# Patient Record
(demographics unavailable — no encounter records)

---

## 2024-10-07 NOTE — DISEASE MANAGEMENT
[Pathological] : TNM Stage: p [N/A] : Currently not applicable [TTNM] : x [NTNM] : x [MTNM] : x [de-identified] : 300cGy [de-identified] : 3000cGy [de-identified] : RIGHT Pelvis

## 2024-10-07 NOTE — HISTORY OF PRESENT ILLNESS
[FreeTextEntry1] : Mr. Durham is a 59-year-old Rwandan speaking male who presents for an on treatment visit.  He is accompanied by his sister for today's visit.    Diagnosis: Newly Diagnosed Plasma Cell Myeloma with right iliac destructive lesion  RADIATION Therapy: 10/7/24 - started on RADIATION Therapy to RIGHT Pelvis, 3000 cGy in 10 fx   HPI: Mr. Durham originally presented to orthopedist, Dr. Espino, in July 2024 with complaints of mid/lower back pain and right hip pain for several months.  On review of his records, he was found to have lytic lesion in right ilium. As per note he had scans at [facility] (MRI Prostate, CT urogram, and NM whole body scan) that revealed numerous bone lesions, suspicious for metastatic disease, including osseous mass within the right iliac bone. (see reports below) MRI of RIGHT hip was ordered.    4/30/24 - Prostate MRI: 1. A 1.0 cm benign prostate utricle cyst. 2. No focal lesion suspicious for significant prostate cancer PIRADS 2. 3. Numerous bone lesions measuring up to 6.4 cm highly suspicious for osseous metastases, multiple myeloma, or primary osseous malignancy. Recommend further evaluation with pelvis radiographs, bone scan, and MRI of pelvis.   5/9/24 - CT A/P: 1. Aggressive osseous mass within the right iliac bone, differential diagnosis includes metastatic disease versus myeloma. 2. Small non-obstructing right intrarenal calculus.    6/3/24 - Bone Scan: 1. There is mild increased radionuclide uptake within the right hemipelvis corresponding to an osteolytic lesion see in this location on a recent CT. Finding is suspicious for malignancy. Suggest tissue diagnosis. This finding could be further characterized with MRI if clinically indicated. 2. Otherwise, unremarkable whole body bone scan.   8/12/24 - MRI of RIGHT Hip: 1. Lytic expansile lesion within the right iliac crest extending to the supraacetabular region with significant endosteal thinning, enhancement, and fluid between the lesion in the adjacent iliacus muscles which would suggest impending pathologic fracture. There is an incomplete fracture within the supraacetabular region. There is additional 12 mm lesion with the same signal characteristics in the posterior superior acetabulum.  Differential would include metastatic disease or myeloma.  This should be correlated with the patient's clinical history. 2. Cam deformity with moderate arthrosis, diffuse labral tear, and joint effusion.    8/20/24 - presented to Dr. Alvarez (hematology) for consultation for right pelvic bone mass. Patient is originally from [facility], [place]-speaking only, no relatives in the [place]. Lytic expansile lesion within the right iliac crest seen on MRI from 8/12/2024 raises the possibility of underlying multiple myeloma or metastatic neoplasm. More ominous, patient has what appears to be an impending pathologic right hip fracture, and incomplete fracture within the supra-acetabular region. No histologic diagnosis available. Needs evaluation by ortho and need work up for diagnosis. Referred to IR for biopsy.   As work up as outpatient was difficult, he was sent to [facility] for admission.   Admitted at [facility] from 8/23/24 - 9/1/24 to undergo workup:   8/22/24 - CT Angio Chest:  1. No pulmonary embolism. 2. The bones appear overall demineralized. Right iliac bone destructive lesion measuring 6.8 x 4.8 cm with extraosseous component extending into the iliacus muscles. Additionally, there is a lytic lesion within T7 vertebral body, questionable lytic lesion within T10 vertebral body, and fracture of the lateral right 9th rib.  3. The exact etiology is unclear, but multiple myeloma is favored over metastasis.    8/23/24 - CT Pelvis Bony: CT of pelvis demonstrates an expansile lytic lesion at the right iliac bone measuring up to 8.1 cm with cortical destruction. Additional ill-defined lytic lesion is seen at the posterior superior acetabulum measuring up to 2.2 cm.   8/26/24 - underwent Bone Marrow Biopsy and Bone Marrow Aspirate: Pathology - Plasma cell Myeloma, 40% plasma cells by differential counts in hemodilute, hypocellular aspirate smears.   While inpatient Mr. Durham was seen by Ortho - no acute surgical intervention and recommended radiation.  Inpatient radiation team also saw patient, as not in acute pain and is largely asymptomatic, he will follow up as an outpatient.    9/9/24 - PET Scan: 1. FDG avid lytic lesions compatible with active multiple myeloma. For follow-up, please request whole body imaging from skull vertex to toes. 2. New since the most recent CT chest nonavid groundglass changes in the right middle lobe, likely inflammatory process. Reassess on follow-up. Details: Bones/soft tissues:  FDG avid lytic lesions compatible with active multiple myeloma, a few examples below: Known right iliac bone lesion with expansile deformity, cortical disruptions and soft tissue component shows SUV 5.7; lesion extends to the right acetabular roof and posterior column of the right acetabulum. Smaller FDG avid lesion in the posterior aspect of the left acetabulum (image 190) with SUV 4.2. Other lesions include left 7th rib with lytic and soft tissue component (image 113) shows SUV 5.6 and less apparent lytic changes in the left 8hth rib with SUV 4.4. T7 with superior endplate sclerosis anteriorly showing minimal activity SUV 4.2 and posterior nonavid lytic lesion (image 101).  9/17/24 - saw Dr. Alvarez (hematology) in follow up as outpatient. Discussed diagnosis of multiple myeloma and PET scan results, compatible with active myeloma. To start on cytoreductive therapy with quadruple regimen (Bortezomib, Revlimid, dexamethasone and daratumumab), to complete 8 cycles prior to reevaluation. Discussed with patient the goal of treatment being remission and subsequent evaluation for autologous stem cell transplant. 9/17/24 - started on Systemic Treatment with Dr. Alvarez (hematology)   9/23/24 - presented in consultation to discuss radiation therapy options.  Mr. Durham is feeling well today.  He complains of pain in the right pelvis towards the back that goes down his leg.  He states it is worse when walking, denies any weakness or numbness of the leg.  He is using crutches to assist with ambulation.  He doesn't take anything for the pain, is about a 5 out of 10. He has started on systemic treatment with Dr. Alvarez (hematology) which he tolerated well, due for next treatment tomorrow.  His sister is here helping him, she lives in [place].  Patient expressed possibility of trying to move to [place] if his insurance will cover treatment there so that he can be closer to family.   in to see patient to discuss transportation options. Mr. Durham attended today with his sister. He has recently been diagnosed with multiple myeloma and has started chemotherapy with Dr Alvarez. He has had long term pain for many months in the right iliac bone region. Imaging has shown multiple lytic lesions throughout the axial skeleton, with the most significant lesion being in the right iliac bone lesion. We reviewed the imaging together. Offered him radiation to the iliac bone lesion to a dose of 30Gy in 10 fractions. Recommend that chemotherapy is held during radiation. We discussed logistics, possible acute and late side effects in detail and he has signed consent. He will return for simulation.  10/7/24 - started on RADIATION Therapy to RIGHT Pelvis, 3000 cGy in 10 fx   10/7/24 - OTV 1/10 fx completed. Reinforced what to expect with radiation therapy, possible side effects, as well as skin care to the treatment area. No new complaints offered.  All questions answered.  He knows that systemic treatment will be held while on radiation therapy. He also stated that the past few days he saw small amount of blood when he moves his bowels, Dr. Alvarez to made aware. He continues to use crutches to assist with ambulation.

## 2024-10-07 NOTE — VITALS
[Pain Description/Quality: ___] : Pain description/quality: [unfilled] [Pain Duration: ___] : Pain duration: [unfilled] [Pain Location: ___] : Pain Location: [unfilled] [Pain Interferes with ADLs] : Pain interferes with activities of daily living. [NoTreatment Scheduled] : no treatment scheduled [Maximal Pain Intensity: 5/10] : 5/10 [Least Pain Intensity: 5/10] : 5/10 [80: Normal activity with effort; some signs or symptoms of disease.] : 80: Normal activity with effort; some signs or symptoms of disease.  [ECOG Performance Status: 1 - Restricted in physically strenuous activity but ambulatory and able to carry out work of a light or sedentary nature] : Performance Status: 1 - Restricted in physically strenuous activity but ambulatory and able to carry out work of a light or sedentary nature, e.g., light house work, office work

## 2024-10-07 NOTE — REVIEW OF SYSTEMS
[Constipation: Grade 1 - Occasional or intermittent symptoms; occasional use of stool softeners, laxatives, dietary modification, or enema] : Constipation: Grade 1 - Occasional or intermittent symptoms; occasional use of stool softeners, laxatives, dietary modification, or enema [Diarrhea: Grade 0] : Diarrhea: Grade 0 [Fatigue: Grade 1 - Fatigue relieved by rest] : Fatigue: Grade 1 - Fatigue relieved by rest [Skin Hyperpigmentation: Grade 0] : Skin Hyperpigmentation: Grade 0 [Dermatitis Radiation: Grade 0] : Dermatitis Radiation: Grade 0

## 2024-10-07 NOTE — DISEASE MANAGEMENT
[Pathological] : TNM Stage: p [N/A] : Currently not applicable [TTNM] : x [NTNM] : x [MTNM] : x [de-identified] : 300cGy [de-identified] : 3000cGy [de-identified] : RIGHT Pelvis

## 2024-10-07 NOTE — REASON FOR VISIT
[Routine On-Treatment] : a routine on-treatment visit for [Other: ___] : [unfilled] [Other: ______] : provided by [facility] [Other: _____] : [unfilled] [Interpreters_IDNumber] : [id number] [TWNoteComboBox1] : French

## 2024-10-07 NOTE — HISTORY OF PRESENT ILLNESS
[FreeTextEntry1] : Mr. Durham is a 59-year-old Gabonese speaking male who presents for an on treatment visit.  He is accompanied by his sister for today's visit.    Diagnosis: Newly Diagnosed Plasma Cell Myeloma with right iliac destructive lesion  RADIATION Therapy: 10/7/24 - started on RADIATION Therapy to RIGHT Pelvis, 3000 cGy in 10 fx   HPI: Mr. Durham originally presented to orthopedist, Dr. Espino, in July 2024 with complaints of mid/lower back pain and right hip pain for several months.  On review of his records, he was found to have lytic lesion in right ilium. As per note he had scans at [facility] (MRI Prostate, CT urogram, and NM whole body scan) that revealed numerous bone lesions, suspicious for metastatic disease, including osseous mass within the right iliac bone. (see reports below) MRI of RIGHT hip was ordered.    4/30/24 - Prostate MRI: 1. A 1.0 cm benign prostate utricle cyst. 2. No focal lesion suspicious for significant prostate cancer PIRADS 2. 3. Numerous bone lesions measuring up to 6.4 cm highly suspicious for osseous metastases, multiple myeloma, or primary osseous malignancy. Recommend further evaluation with pelvis radiographs, bone scan, and MRI of pelvis.   5/9/24 - CT A/P: 1. Aggressive osseous mass within the right iliac bone, differential diagnosis includes metastatic disease versus myeloma. 2. Small non-obstructing right intrarenal calculus.    6/3/24 - Bone Scan: 1. There is mild increased radionuclide uptake within the right hemipelvis corresponding to an osteolytic lesion see in this location on a recent CT. Finding is suspicious for malignancy. Suggest tissue diagnosis. This finding could be further characterized with MRI if clinically indicated. 2. Otherwise, unremarkable whole body bone scan.   8/12/24 - MRI of RIGHT Hip: 1. Lytic expansile lesion within the right iliac crest extending to the supraacetabular region with significant endosteal thinning, enhancement, and fluid between the lesion in the adjacent iliacus muscles which would suggest impending pathologic fracture. There is an incomplete fracture within the supraacetabular region. There is additional 12 mm lesion with the same signal characteristics in the posterior superior acetabulum.  Differential would include metastatic disease or myeloma.  This should be correlated with the patient's clinical history. 2. Cam deformity with moderate arthrosis, diffuse labral tear, and joint effusion.    8/20/24 - presented to Dr. Alvarez (hematology) for consultation for right pelvic bone mass. Patient is originally from [facility], [place]-speaking only, no relatives in the [place]. Lytic expansile lesion within the right iliac crest seen on MRI from 8/12/2024 raises the possibility of underlying multiple myeloma or metastatic neoplasm. More ominous, patient has what appears to be an impending pathologic right hip fracture, and incomplete fracture within the supra-acetabular region. No histologic diagnosis available. Needs evaluation by ortho and need work up for diagnosis. Referred to IR for biopsy.   As work up as outpatient was difficult, he was sent to [facility] for admission.   Admitted at [facility] from 8/23/24 - 9/1/24 to undergo workup:   8/22/24 - CT Angio Chest:  1. No pulmonary embolism. 2. The bones appear overall demineralized. Right iliac bone destructive lesion measuring 6.8 x 4.8 cm with extraosseous component extending into the iliacus muscles. Additionally, there is a lytic lesion within T7 vertebral body, questionable lytic lesion within T10 vertebral body, and fracture of the lateral right 9th rib.  3. The exact etiology is unclear, but multiple myeloma is favored over metastasis.    8/23/24 - CT Pelvis Bony: CT of pelvis demonstrates an expansile lytic lesion at the right iliac bone measuring up to 8.1 cm with cortical destruction. Additional ill-defined lytic lesion is seen at the posterior superior acetabulum measuring up to 2.2 cm.   8/26/24 - underwent Bone Marrow Biopsy and Bone Marrow Aspirate: Pathology - Plasma cell Myeloma, 40% plasma cells by differential counts in hemodilute, hypocellular aspirate smears.   While inpatient Mr. Durham was seen by Ortho - no acute surgical intervention and recommended radiation.  Inpatient radiation team also saw patient, as not in acute pain and is largely asymptomatic, he will follow up as an outpatient.    9/9/24 - PET Scan: 1. FDG avid lytic lesions compatible with active multiple myeloma. For follow-up, please request whole body imaging from skull vertex to toes. 2. New since the most recent CT chest nonavid groundglass changes in the right middle lobe, likely inflammatory process. Reassess on follow-up. Details: Bones/soft tissues:  FDG avid lytic lesions compatible with active multiple myeloma, a few examples below: Known right iliac bone lesion with expansile deformity, cortical disruptions and soft tissue component shows SUV 5.7; lesion extends to the right acetabular roof and posterior column of the right acetabulum. Smaller FDG avid lesion in the posterior aspect of the left acetabulum (image 190) with SUV 4.2. Other lesions include left 7th rib with lytic and soft tissue component (image 113) shows SUV 5.6 and less apparent lytic changes in the left 8hth rib with SUV 4.4. T7 with superior endplate sclerosis anteriorly showing minimal activity SUV 4.2 and posterior nonavid lytic lesion (image 101).  9/17/24 - saw Dr. Alvarez (hematology) in follow up as outpatient. Discussed diagnosis of multiple myeloma and PET scan results, compatible with active myeloma. To start on cytoreductive therapy with quadruple regimen (Bortezomib, Revlimid, dexamethasone and daratumumab), to complete 8 cycles prior to reevaluation. Discussed with patient the goal of treatment being remission and subsequent evaluation for autologous stem cell transplant. 9/17/24 - started on Systemic Treatment with Dr. Alvarez (hematology)   9/23/24 - presented in consultation to discuss radiation therapy options.  Mr. Durham is feeling well today.  He complains of pain in the right pelvis towards the back that goes down his leg.  He states it is worse when walking, denies any weakness or numbness of the leg.  He is using crutches to assist with ambulation.  He doesn't take anything for the pain, is about a 5 out of 10. He has started on systemic treatment with Dr. Alvarez (hematology) which he tolerated well, due for next treatment tomorrow.  His sister is here helping him, she lives in [place].  Patient expressed possibility of trying to move to [place] if his insurance will cover treatment there so that he can be closer to family.   in to see patient to discuss transportation options. Mr. Durham attended today with his sister. He has recently been diagnosed with multiple myeloma and has started chemotherapy with Dr Alvarez. He has had long term pain for many months in the right iliac bone region. Imaging has shown multiple lytic lesions throughout the axial skeleton, with the most significant lesion being in the right iliac bone lesion. We reviewed the imaging together. Offered him radiation to the iliac bone lesion to a dose of 30Gy in 10 fractions. Recommend that chemotherapy is held during radiation. We discussed logistics, possible acute and late side effects in detail and he has signed consent. He will return for simulation.  10/7/24 - started on RADIATION Therapy to RIGHT Pelvis, 3000 cGy in 10 fx   10/7/24 - OTV 1/10 fx completed. Reinforced what to expect with radiation therapy, possible side effects, as well as skin care to the treatment area. No new complaints offered.  All questions answered.  He knows that systemic treatment will be held while on radiation therapy. He also stated that the past few days he saw small amount of blood when he moves his bowels, Dr. Alvarez to made aware. He continues to use crutches to assist with ambulation.

## 2024-10-07 NOTE — REASON FOR VISIT
[Routine On-Treatment] : a routine on-treatment visit for [Other: ___] : [unfilled] [Other: ______] : provided by [facility] [Other: _____] : [unfilled] [Interpreters_IDNumber] : [id number] [TWNoteComboBox1] : Filipino

## 2024-10-14 NOTE — HISTORY OF PRESENT ILLNESS
[FreeTextEntry1] : Mr. Durham is a 59-year-old Guinean speaking male who presents for an on treatment visit.  He is accompanied by his sister for today's visit.    Diagnosis: Newly Diagnosed Plasma Cell Myeloma with RIGHT iliac destructive lesion  RADIATION Therapy: 10/7/24 - started on RADIATION Therapy to RIGHT Pelvis, 3000 cGy in 10 fx   HPI: Mr. Durham originally presented to orthopedist, Dr. Espino, in July 2024 with complaints of mid/lower back pain and right hip pain for several months.  On review of his records, he was found to have lytic lesion in right ilium. As per note he had scans at [facility] (MRI Prostate, CT urogram, and NM whole body scan) that revealed numerous bone lesions, suspicious for metastatic disease, including osseous mass within the right iliac bone. (see reports below) MRI of RIGHT hip was ordered.    4/30/24 - Prostate MRI: 1. A 1.0 cm benign prostate utricle cyst. 2. No focal lesion suspicious for significant prostate cancer PIRADS 2. 3. Numerous bone lesions measuring up to 6.4 cm highly suspicious for osseous metastases, multiple myeloma, or primary osseous malignancy. Recommend further evaluation with pelvis radiographs, bone scan, and MRI of pelvis.   5/9/24 - CT A/P: 1. Aggressive osseous mass within the right iliac bone, differential diagnosis includes metastatic disease versus myeloma. 2. Small non-obstructing right intrarenal calculus.    6/3/24 - Bone Scan: 1. There is mild increased radionuclide uptake within the right hemipelvis corresponding to an osteolytic lesion see in this location on a recent CT. Finding is suspicious for malignancy. Suggest tissue diagnosis. This finding could be further characterized with MRI if clinically indicated. 2. Otherwise, unremarkable whole body bone scan.   8/12/24 - MRI of RIGHT Hip: 1. Lytic expansile lesion within the right iliac crest extending to the supraacetabular region with significant endosteal thinning, enhancement, and fluid between the lesion in the adjacent iliacus muscles which would suggest impending pathologic fracture. There is an incomplete fracture within the supraacetabular region. There is additional 12 mm lesion with the same signal characteristics in the posterior superior acetabulum.  Differential would include metastatic disease or myeloma.  This should be correlated with the patient's clinical history. 2. Cam deformity with moderate arthrosis, diffuse labral tear, and joint effusion.    8/20/24 - presented to Dr. Alvarez (hematology) for consultation for right pelvic bone mass. Patient is originally from [facility], Gu[place]an-speaking only, no relatives in the [place]. Lytic expansile lesion within the right iliac crest seen on MRI from 8/12/2024 raises the possibility of underlying multiple myeloma or metastatic neoplasm. More ominous, patient has what appears to be an impending pathologic right hip fracture, and incomplete fracture within the supra-acetabular region. No histologic diagnosis available. Needs evaluation by ortho and need work up for diagnosis. Referred to IR for biopsy.   As work up as outpatient was difficult, he was sent to [facility] for admission.   Admitted at [facility] from 8/23/24 - 9/1/24 to undergo workup:   8/22/24 - CT Angio Chest:  1. No pulmonary embolism. 2. The bones appear overall demineralized. Right iliac bone destructive lesion measuring 6.8 x 4.8 cm with extraosseous component extending into the iliacus muscles. Additionally, there is a lytic lesion within T7 vertebral body, questionable lytic lesion within T10 vertebral body, and fracture of the lateral right 9th rib.  3. The exact etiology is unclear, but multiple myeloma is favored over metastasis.    8/23/24 - CT Pelvis Bony: CT of pelvis demonstrates an expansile lytic lesion at the right iliac bone measuring up to 8.1 cm with cortical destruction. Additional ill-defined lytic lesion is seen at the posterior superior acetabulum measuring up to 2.2 cm.   8/26/24 - underwent Bone Marrow Biopsy and Bone Marrow Aspirate: Pathology - Plasma cell Myeloma, 40% plasma cells by differential counts in hemodilute, hypocellular aspirate smears.   While inpatient Mr. Durham was seen by Ortho - no acute surgical intervention and recommended radiation.  Inpatient radiation team also saw patient, as not in acute pain and is largely asymptomatic, he will follow up as an outpatient.    9/9/24 - PET Scan: 1. FDG avid lytic lesions compatible with active multiple myeloma. For follow-up, please request whole body imaging from skull vertex to toes. 2. New since the most recent CT chest nonavid groundglass changes in the right middle lobe, likely inflammatory process. Reassess on follow-up. Details: Bones/soft tissues:  FDG avid lytic lesions compatible with active multiple myeloma, a few examples below: Known right iliac bone lesion with expansile deformity, cortical disruptions and soft tissue component shows SUV 5.7; lesion extends to the right acetabular roof and posterior column of the right acetabulum. Smaller FDG avid lesion in the posterior aspect of the left acetabulum (image 190) with SUV 4.2. Other lesions include left 7th rib with lytic and soft tissue component (image 113) shows SUV 5.6 and less apparent lytic changes in the left 8hth rib with SUV 4.4. T7 with superior endplate sclerosis anteriorly showing minimal activity SUV 4.2 and posterior nonavid lytic lesion (image 101).  9/17/24 - saw Dr. Alvarez (hematology) in follow up as outpatient. Discussed diagnosis of multiple myeloma and PET scan results, compatible with active myeloma. To start on cytoreductive therapy with quadruple regimen (Bortezomib, Revlimid, dexamethasone and daratumumab), to complete 8 cycles prior to reevaluation. Discussed with patient the goal of treatment being remission and subsequent evaluation for autologous stem cell transplant. 9/17/24 - started on Systemic Treatment with Dr. Alvarez (hematology)   9/23/24 - presented in consultation to discuss radiation therapy options.  Mr. Durham is feeling well today.  He complains of pain in the right pelvis towards the back that goes down his leg.  He states it is worse when walking, denies any weakness or numbness of the leg.  He is using crutches to assist with ambulation.  He doesn't take anything for the pain, is about a 5 out of 10. He has started on systemic treatment with Dr. Alvarez (hematology) which he tolerated well, due for next treatment tomorrow.  His sister is here helping him, she lives in [place].  Patient expressed possibility of trying to move to [place] if his insurance will cover treatment there so that he can be closer to family.   in to see patient to discuss transportation options. Mr. Durham attended today with his sister. He has recently been diagnosed with multiple myeloma and has started chemotherapy with Dr Alvarez. He has had long term pain for many months in the right iliac bone region. Imaging has shown multiple lytic lesions throughout the axial skeleton, with the most significant lesion being in the right iliac bone lesion. We reviewed the imaging together. Offered him radiation to the iliac bone lesion to a dose of 30Gy in 10 fractions. Recommend that chemotherapy is held during radiation. We discussed logistics, possible acute and late side effects in detail and he has signed consent. He will return for simulation.  10/7/24 - started on RADIATION Therapy to RIGHT Pelvis, 3000 cGy in 10 fx   10/7/24 - OTV 1/10 fx completed. Reinforced what to expect with radiation therapy, possible side effects, as well as skin care to the treatment area. No new complaints offered.  All questions answered.  He knows that systemic treatment will be held while on radiation therapy. He also stated that the past few days he saw small amount of blood when he moves his bowels, Dr. Alvarez to made aware. He continues to use crutches to assist with ambulation.  No infusional chemo during RT in view of large field.  10/14/24 - OTV 6/10 fx completed. Mr. Durham continues to tolerate treatment well. He states that he feels he is walking better since starting the radiation. Pain is better. Denies any other complaints. No bowel or urinary issues. Looks forward to completing treatment this week.   Discharge instructions/folder given and reviewed.  Post treatment evaluation appointment scheduled.

## 2024-10-14 NOTE — REASON FOR VISIT
[Routine On-Treatment] : a routine on-treatment visit for [Other: ___] : [unfilled] [Other: _____] : [unfilled] [Other: ______] : provided by [employer] [Interpreters_IDNumber] : [id number] [[email or web address]] : [place]

## 2024-10-14 NOTE — DISEASE MANAGEMENT
[Pathological] : TNM Stage: p [N/A] : Currently not applicable [TTNM] : x [NTNM] : x [MTNM] : x [de-identified] : 300cGy [de-identified] : 3000cGy [de-identified] : RIGHT Pelvis

## 2024-10-14 NOTE — VITALS
[Maximal Pain Intensity: 5/10] : 5/10 [Pain Description/Quality: ___] : Pain description/quality: [unfilled] [Pain Duration: ___] : Pain duration: [unfilled] [Pain Location: ___] : Pain Location: [unfilled] [Least Pain Intensity: 1/10] : 1/10 [Pain Interferes with ADLs] : Pain interferes with activities of daily living. [80: Normal activity with effort; some signs or symptoms of disease.] : 80: Normal activity with effort; some signs or symptoms of disease.  [ECOG Performance Status: 1 - Restricted in physically strenuous activity but ambulatory and able to carry out work of a light or sedentary nature] : Performance Status: 1 - Restricted in physically strenuous activity but ambulatory and able to carry out work of a light or sedentary nature, e.g., light house work, office work

## 2024-10-29 NOTE — BEGINNING OF VISIT
[0] : 2) Feeling down, depressed, or hopeless: Not at all (0) [PHQ-2 Negative] : PHQ-2 Negative [Advised Primary Care Follow-up] : Advised Primary Care Follow-up  [Pain Scale: ___] : On a scale of 1-10, today the patient's pain is a(n) [unfilled]. [Medication(s)] : Medication(s) [Referred to Palliative/Pain Management] : Referred to Palliative/Pain Management [Former] : Former [Patient/Caregiver not ready to engage] : Patient/Caregiver not ready to engage

## 2024-10-29 NOTE — HISTORY OF PRESENT ILLNESS
[de-identified] : 59M, PMHx colon polyps, reporting right hip pain since August 2024, referred for medical oncology consultation of right pelvis bone mass.  CASE SYNOPSIS: 7/23/2024 colonoscopy( Dr. Emely Mccarthy)-findings: A 12 mm polyp in the sigmoid colon, semi- pedunculated, removed with hot snare External hemorrhoids grade 1  8/12/2024 MRI right hip without contrast ([facility])- IMPRESSION: 1.  Lytic expansile lesion within the right iliac crest extending to the supra-acetabular region with significant endosteal thinning, enhancement, and fluid between the lesion and the adjacent iliac us muscle which would suggest impending pathologic fracture.  There is an incomplete fracture within the supra-acetabular region.  There is additional 12 mm lesion with the same signal characteristics in the posterior superior acetabulum.  Differential would include metastatic disease or myeloma. 2.  Cam deformity with moderate arthrosis, diffuse labral tear and joint effusion.  8/23/2024 TTE- IMPRESSION:  1. Left ventricular cavity is normal in size. Left ventricular wall thickness is normal. Left ventricular systolic function is mildly decreased with an ejection fraction of 52 % by Ruiz's method of disks. There are no regional wall motion abnormalities seen.  2. There is mild (grade 1) left ventricular diastolic dysfunction, with normal left ventricular filling pressure.  3. Enlarged right ventricular cavity size, with normal wall thickness, and normal right ventricular systolic function.  4. Mild mitral regurgitation.  5. Normal left and right atrial size.  6. No pericardial effusion seen.  7. No prior echocardiogram is available for comparison.  8/26/2024 bone marrow studies plasma cell myeloma, 40% plasma cells,  positive. Flow cytometry-aberrant plasma cells (0.65% of cells, 87% of plasma cells) detected by flow cytometry analysis.  Congo red negative.  M spike 8.1 g/dL  9/9/2024 PET- IMPRESSION: 1. FDG avid lytic lesions with few examples above compatible with active multiple myeloma. For follow-up please request whole body imaging from skull vertex to toes. 2. New since the most recent CT chest nonavid groundglass changes in the right middle lobe, likely inflammatory process. Reassess on follow-up.  10/7 - 10/18/2024 completed 3000 cGy to right iliac destructive lesion. [FreeTextEntry1] : Workup in progress [de-identified] : Started RT to right pelvis on 10/7/2024 and completed 3000 cGy to right iliac destructive lesion on October 18,2024.  Continues to complain of mid/lower back pain and right hip pain for several months.  Ambulates without assistive devices.  Since the last visit there has been no changes in physical examination. Reports no new symptoms; clinically stable.  Denies recent hospitalization.  Medication list unchanged. No other changes in medical, surgical or social history since last visit.  Hematologic profile stable. Accompanied by his sister, Silvia.

## 2024-10-29 NOTE — HISTORY OF PRESENT ILLNESS
[de-identified] : 59M, PMHx colon polyps, reporting right hip pain since August 2024, referred for medical oncology consultation of right pelvis bone mass.  CASE SYNOPSIS: 7/23/2024 colonoscopy( Dr. Emely Mccarthy)-findings: A 12 mm polyp in the sigmoid colon, semi- pedunculated, removed with hot snare External hemorrhoids grade 1  8/12/2024 MRI right hip without contrast ([facility])- IMPRESSION: 1.  Lytic expansile lesion within the right iliac crest extending to the supra-acetabular region with significant endosteal thinning, enhancement, and fluid between the lesion and the adjacent iliac us muscle which would suggest impending pathologic fracture.  There is an incomplete fracture within the supra-acetabular region.  There is additional 12 mm lesion with the same signal characteristics in the posterior superior acetabulum.  Differential would include metastatic disease or myeloma. 2.  Cam deformity with moderate arthrosis, diffuse labral tear and joint effusion.  8/23/2024 TTE- IMPRESSION:  1. Left ventricular cavity is normal in size. Left ventricular wall thickness is normal. Left ventricular systolic function is mildly decreased with an ejection fraction of 52 % by Ruiz's method of disks. There are no regional wall motion abnormalities seen.  2. There is mild (grade 1) left ventricular diastolic dysfunction, with normal left ventricular filling pressure.  3. Enlarged right ventricular cavity size, with normal wall thickness, and normal right ventricular systolic function.  4. Mild mitral regurgitation.  5. Normal left and right atrial size.  6. No pericardial effusion seen.  7. No prior echocardiogram is available for comparison.  8/26/2024 bone marrow studies plasma cell myeloma, 40% plasma cells,  positive. Flow cytometry-aberrant plasma cells (0.65% of cells, 87% of plasma cells) detected by flow cytometry analysis.  Congo red negative.  M spike 8.1 g/dL  9/9/2024 PET- IMPRESSION: 1. FDG avid lytic lesions with few examples above compatible with active multiple myeloma. For follow-up please request whole body imaging from skull vertex to toes. 2. New since the most recent CT chest nonavid groundglass changes in the right middle lobe, likely inflammatory process. Reassess on follow-up.  10/7 - 10/18/2024 completed 3000 cGy to right iliac destructive lesion. [FreeTextEntry1] : Workup in progress [de-identified] : Started RT to right pelvis on 10/7/2024 and completed 3000 cGy to right iliac destructive lesion on October 18,2024.  Continues to complain of mid/lower back pain and right hip pain for several months.  Ambulates without assistive devices.  Since the last visit there has been no changes in physical examination. Reports no new symptoms; clinically stable.  Denies recent hospitalization.  Medication list unchanged. No other changes in medical, surgical or social history since last visit.  Hematologic profile stable. Accompanied by his sister, Silvia.

## 2024-10-29 NOTE — REASON FOR VISIT
[Follow-Up Visit] : a follow-up [[employer] ] : provided by [employer]   [FreeTextEntry2] : multiple myeloma [Interpreters_IDNumber] : [id number] [Interpreters_FullName] :  [TWNoteComboBox1] : Welsh

## 2024-10-29 NOTE — REASON FOR VISIT
[Follow-Up Visit] : a follow-up [[employer] ] : provided by [employer]   [FreeTextEntry2] : multiple myeloma [Interpreters_IDNumber] : [id number] [Interpreters_FullName] :  [[email or web address]] : Danish

## 2024-10-29 NOTE — HISTORY OF PRESENT ILLNESS
[de-identified] : 59M, PMHx colon polyps, reporting right hip pain since August 2024, referred for medical oncology consultation of right pelvis bone mass.  CASE SYNOPSIS: 7/23/2024 colonoscopy( Dr. Emely Mccarthy)-findings: A 12 mm polyp in the sigmoid colon, semi- pedunculated, removed with hot snare External hemorrhoids grade 1  8/12/2024 MRI right hip without contrast ([facility])- IMPRESSION: 1.  Lytic expansile lesion within the right iliac crest extending to the supra-acetabular region with significant endosteal thinning, enhancement, and fluid between the lesion and the adjacent iliac us muscle which would suggest impending pathologic fracture.  There is an incomplete fracture within the supra-acetabular region.  There is additional 12 mm lesion with the same signal characteristics in the posterior superior acetabulum.  Differential would include metastatic disease or myeloma. 2.  Cam deformity with moderate arthrosis, diffuse labral tear and joint effusion.  8/23/2024 TTE- IMPRESSION:  1. Left ventricular cavity is normal in size. Left ventricular wall thickness is normal. Left ventricular systolic function is mildly decreased with an ejection fraction of 52 % by Ruiz's method of disks. There are no regional wall motion abnormalities seen.  2. There is mild (grade 1) left ventricular diastolic dysfunction, with normal left ventricular filling pressure.  3. Enlarged right ventricular cavity size, with normal wall thickness, and normal right ventricular systolic function.  4. Mild mitral regurgitation.  5. Normal left and right atrial size.  6. No pericardial effusion seen.  7. No prior echocardiogram is available for comparison.  8/26/2024 bone marrow studies plasma cell myeloma, 40% plasma cells,  positive. Flow cytometry-aberrant plasma cells (0.65% of cells, 87% of plasma cells) detected by flow cytometry analysis.  Congo red negative.  M spike 8.1 g/dL  9/9/2024 PET- IMPRESSION: 1. FDG avid lytic lesions with few examples above compatible with active multiple myeloma. For follow-up please request whole body imaging from skull vertex to toes. 2. New since the most recent CT chest nonavid groundglass changes in the right middle lobe, likely inflammatory process. Reassess on follow-up.  10/7 - 10/18/2024 completed 3000 cGy to right iliac destructive lesion. [FreeTextEntry1] : Workup in progress [de-identified] : Started RT to right pelvis on 10/7/2024 and completed 3000 cGy to right iliac destructive lesion on October 18,2024.  Continues to complain of mid/lower back pain and right hip pain for several months.  Ambulates without assistive devices.  Since the last visit there has been no changes in physical examination. Reports no new symptoms; clinically stable.  Denies recent hospitalization.  Medication list unchanged. No other changes in medical, surgical or social history since last visit.  Hematologic profile stable. Accompanied by his sister, Silvia.

## 2024-10-29 NOTE — REVIEW OF SYSTEMS
[Diarrhea: Grade 0] : Diarrhea: Grade 0 [Joint Pain] : joint pain [Negative] : Psychiatric [FreeTextEntry9] : Right hip pain x 3 weeks

## 2024-10-29 NOTE — ASSESSMENT
[FreeTextEntry1] : Mr. BARAJAS 's questions were answered to his satisfaction.  He  expressed his  understanding and willingness to comply with the above recommendations, and  will return to the office in 3 weeks.

## 2024-10-29 NOTE — REASON FOR VISIT
[Follow-Up Visit] : a follow-up [[employer] ] : provided by [employer]   [FreeTextEntry2] : multiple myeloma [Interpreters_IDNumber] : [id number] [Interpreters_FullName] :  [[email or web address]] : Filipino

## 2024-11-04 NOTE — PHYSICAL EXAM
[Normal] : no acute distress, well nourished, well developed and well-appearing [No Respiratory Distress] : no respiratory distress  [No Accessory Muscle Use] : no accessory muscle use [Clear to Auscultation] : lungs were clear to auscultation bilaterally [Normal Rate] : normal rate  [Regular Rhythm] : with a regular rhythm [Normal S1, S2] : normal S1 and S2 [Soft] : abdomen soft [Non-distended] : non-distended [Non Tender] : non-tender [Normal Affect] : the affect was normal [Normal Mood] : the mood was normal [Alert and Oriented x3] : oriented to person, place, and time [Normal Insight/Judgement] : insight and judgment were intact

## 2024-11-04 NOTE — PHYSICAL EXAM
[Normal] : no acute distress, well nourished, well developed and well-appearing [No Respiratory Distress] : no respiratory distress  [No Accessory Muscle Use] : no accessory muscle use [Clear to Auscultation] : lungs were clear to auscultation bilaterally [Normal Rate] : normal rate  [Regular Rhythm] : with a regular rhythm [Normal S1, S2] : normal S1 and S2 [Soft] : abdomen soft [Non-distended] : non-distended [Non Tender] : non-tender [Normal Affect] : the affect was normal [Alert and Oriented x3] : oriented to person, place, and time [Normal Mood] : the mood was normal [Normal Insight/Judgement] : insight and judgment were intact

## 2024-11-05 NOTE — HISTORY OF PRESENT ILLNESS
[FreeTextEntry1] : Follow up visit for chronic medical conditions.  [de-identified] : Patient lives alone. He presents with his sister who is visiting him from [place]. The patient is a German speaking 58 y/o M with PMHx of HLD, prediabetes, and recently diagnosed Multiple Myeloma (following oncology) who presents today for a follow up visit for chronic medical conditions.  The patient went to ED in Aug for right hip pain for >6months. CT of the hip revealed lytic lesions within the right iliac crest. patient had Bone marrow biopsy and PET scan. PET scan 09/09/2024- showed lytic lesions compatible with active Multiple Myeloma. Patient was started on induction therapy. In the hospital, patient was also noticed with bradycardia to the HR in 30s and later on he had Afib with RVR converted back to SB. Patient saw EP in the hospital. Patient followed with the cardiology outpatient and was placed on holter monitor for a week. As per patient there were no findings on the monitor. Patient also found to have 9th rib fracture in the hospital, had PNA s/p ABx, anemia Hb 6.6 (s/p 1 unit Blood transfusion).  He is c/o on and off constipation. Taking Metamucil as needed with good results. requesting Rx to be sent to preferred pharmacy. He denies abdominal pain, omar, denies hematochezia and early satiety.

## 2024-11-05 NOTE — HISTORY OF PRESENT ILLNESS
[FreeTextEntry1] : Follow up visit for chronic medical conditions.  [de-identified] : Patient lives alone. He presents with his sister who is visiting him from [place]. The patient is a Kinyarwanda speaking 58 y/o M with PMHx of HLD, prediabetes, and recently diagnosed Multiple Myeloma (following oncology) who presents today for a follow up visit for chronic medical conditions.  The patient went to ED in Aug for right hip pain for >6months. CT of the hip revealed lytic lesions within the right iliac crest. patient had Bone marrow biopsy and PET scan. PET scan 09/09/2024- showed lytic lesions compatible with active Multiple Myeloma. Patient was started on induction therapy. In the hospital, patient was also noticed with bradycardia to the HR in 30s and later on he had Afib with RVR converted back to SB. Patient saw EP in the hospital. Patient followed with the cardiology outpatient and was placed on holter monitor for a week. As per patient there were no findings on the monitor. Patient also found to have 9th rib fracture in the hospital, had PNA s/p ABx, anemia Hb 6.6 (s/p 1 unit Blood transfusion).  He is c/o on and off constipation. Taking Metamucil as needed with good results. requesting Rx to be sent to preferred pharmacy. He denies abdominal pain, omar, denies hematochezia and early satiety.

## 2024-11-05 NOTE — INTERPRETER SERVICES
[Interpreters_IDNumber] : [id number] [Interpreters_FullName] : Juan Luis [TWNoteComboBox1] : [place] Islander

## 2024-11-05 NOTE — ASSESSMENT
[FreeTextEntry1] : The plan of care was discussed with the patient in full detail. He verbalized understanding and in agreement with the plan of care.  F/u via TTM in 2 weeks for BP management.

## 2024-11-15 NOTE — REVIEW OF SYSTEMS
[Heart Rate Is Slow] : slow heart rate [As Noted in HPI] : as noted in HPI [Negative] : Endocrine [FreeTextEntry9] : Bone lesions [de-identified] : MM

## 2024-11-15 NOTE — HISTORY OF PRESENT ILLNESS
[FreeTextEntry1] : Patient is a 59-year-old gentleman Mongolian-speaking, accompanied by his sister Silvia.   was Juan José #[id number].  Patient is referred because of constipation since he started Revlimid on 11/2/2024.  He has no abdominal pain.  He does sees some blood on the paper and in the bowl but no mucus.  He has been told of having hemorrhoids.  He had a colonoscopy in June elsewhere that revealed a 12 mm sigmoid polyp that was removed.  Otherwise, the colonoscopy just revealed hemorrhoids. He has not had any constipation before.  He has no upper gastrointestinal symptoms.     Heme note of 10/29/2024- 59M, PMHx colon polyps, reporting right hip pain since August 2024, referred for medical oncology consultation of right pelvis bone mass.  CASE SYNOPSIS: 7/23/2024 colonoscopy( Dr. Emely Mccarthy)-findings: A 12 mm polyp in the sigmoid colon, semi- pedunculated, removed with hot snare External hemorrhoids grade 1  8/12/2024 MRI right hip without contrast ([facility])- IMPRESSION: 1. Lytic expansile lesion within the right iliac crest extending to the supra-acetabular region with significant endosteal thinning, enhancement, and fluid between the lesion and the adjacent iliac us muscle which would suggest impending pathologic fracture. There is an incomplete fracture within the supra-acetabular region. There is additional 12 mm lesion with the same signal characteristics in the posterior superior acetabulum. Differential would include metastatic disease or myeloma. 2. Cam deformity with moderate arthrosis, diffuse labral tear and joint effusion.  8/23/2024 TTE- IMPRESSION:  1. Left ventricular cavity is normal in size. Left ventricular wall thickness is normal. Left ventricular systolic function is mildly decreased with an ejection fraction of 52 % by Ruiz's method of disks. There are no regional wall motion abnormalities seen.  2. There is mild (grade 1) left ventricular diastolic dysfunction, with normal left ventricular filling pressure.  3. Enlarged right ventricular cavity size, with normal wall thickness, and normal right ventricular systolic function.  4. Mild mitral regurgitation.  5. Normal left and right atrial size.  6. No pericardial effusion seen.  7. No prior echocardiogram is available for comparison.  8/26/2024 bone marrow studies plasma cell myeloma, 40% plasma cells,  positive. Flow cytometry-aberrant plasma cells (0.65% of cells, 87% of plasma cells) detected by flow cytometry analysis. Congo red negative. M spike 8.1 g/dL  9/9/2024 PET- IMPRESSION: 1. FDG avid lytic lesions with few examples above compatible with active multiple myeloma. For follow-up please request whole body imaging from skull vertex to toes. 2. New since the most recent CT chest nonavid groundglass changes in the right middle lobe, likely inflammatory process. Reassess on follow-up.  10/7 - 10/18/2024 completed 3000 cGy to right iliac destructive lesion.        Current Treatment Status:. Workup in progress.      Interval History: Started RT to right pelvis on 10/7/2024 and completed 3000 cGy to right iliac destructive lesion on October 18,2024. Continues to complain of mid/lower back pain and right hip pain for several months. Ambulates without assistive devices. Since the last visit there has been no changes in physical examination. Reports no new symptoms; clinically stable. Denies recent hospitalization. Medication list unchanged. No other changes in medical, surgical or social history since last visit. Hematologic profile stable. Accompanied by his sister, Silvia.

## 2024-11-15 NOTE — ASSESSMENT
[FreeTextEntry1] : Patient with constipation since starting Revlimid.  He Colace will be increased to twice daily.  MiraLAX 17 g will be given daily.  Hemorrhoidal cream will be ordered for his hemorrhoids.  He did have a colonoscopy elsewhere in June which revealed 1 sigmoid colon polyp that was removed. Patient will call if the above regimen does not help.

## 2024-11-15 NOTE — PHYSICAL EXAM
[Alert] : alert [Normal Voice/Communication] : normal voice/communication [Healthy Appearing] : healthy appearing [No Acute Distress] : no acute distress [Sclera] : the sclera and conjunctiva were normal [Hearing Threshold Finger Rub Not [place]] : hearing was normal [Normal Lips/Gums] : the lips and gums were normal [Oropharynx] : the oropharynx was normal [Normal Appearance] : the appearance of the neck was normal [No Neck Mass] : no neck mass was observed [No Respiratory Distress] : no respiratory distress [No Acc Muscle Use] : no accessory muscle use [Respiration, Rhythm And Depth] : normal respiratory rhythm and effort [Auscultation Breath Sounds / Voice Sounds] : lungs were clear to auscultation bilaterally [Normal S1, S2] : normal S1 and S2 [Murmurs] : no murmurs [Bowel Sounds] : normal bowel sounds [Abdomen Tenderness] : non-tender [No Masses] : no abdominal mass palpated [Abdomen Soft] : soft [] : no hepatosplenomegaly [Oriented To Time, Place, And Person] : oriented to person, place, and time [de-identified] : Bradycardia

## 2024-11-25 NOTE — VITALS
[Maximal Pain Intensity: 5/10] : 5/10 [Least Pain Intensity: 1/10] : 1/10 [Pain Description/Quality: ___] : Pain description/quality: [unfilled] [Pain Duration: ___] : Pain duration: [unfilled] [Pain Location: ___] : Pain Location: [unfilled] [Pain Interferes with ADLs] : Pain interferes with activities of daily living. [Maximal Pain Intensity: 3/10] : 3/10 [NoTreatment Scheduled] : no treatment scheduled [80: Normal activity with effort; some signs or symptoms of disease.] : 80: Normal activity with effort; some signs or symptoms of disease.  [ECOG Performance Status: 1 - Restricted in physically strenuous activity but ambulatory and able to carry out work of a light or sedentary nature] : Performance Status: 1 - Restricted in physically strenuous activity but ambulatory and able to carry out work of a light or sedentary nature, e.g., light house work, office work

## 2024-11-25 NOTE — REASON FOR VISIT
[Post-Treatment Evaluation] : post-treatment evaluation for [Other: ___] : [unfilled] [Other: ______] : provided by [employer] [Other: _____] : [unfilled] [Interpreters_IDNumber] : [id number] [TWNoteComboBox1] : [place]

## 2024-11-25 NOTE — REVIEW OF SYSTEMS
[Constipation: Grade 1 - Occasional or intermittent symptoms; occasional use of stool softeners, laxatives, dietary modification, or enema] : Constipation: Grade 1 - Occasional or intermittent symptoms; occasional use of stool softeners, laxatives, dietary modification, or enema [Diarrhea: Grade 0] : Diarrhea: Grade 0 [Fatigue: Grade 0] : Fatigue: Grade 0 [Skin Hyperpigmentation: Grade 0] : Skin Hyperpigmentation: Grade 0 [Dermatitis Radiation: Grade 0] : Dermatitis Radiation: Grade 0

## 2024-11-25 NOTE — HISTORY OF PRESENT ILLNESS
[FreeTextEntry1] : Mr. Durham is a 59-year-old Tuvaluan speaking male who presents for post treatment evaluation appointment.  He is accompanied by his sister for today's visit.   Diagnosis: Newly Diagnosed Plasma Cell Myeloma with RIGHT iliac destructive lesion  RADIATION Therapy: 10/7/24 - 10/18/24: Received RADIATION Therapy to RIGHT Pelvis, 3000 cGy in 10 fx   HPI: Mr. Durham originally presented to orthopedist, Dr. Espino, in July 2024 with complaints of mid/lower back pain and right hip pain for several months.  On review of his records, he was found to have lytic lesion in right ilium. As per note he had scans at [facility] (MRI Prostate, CT urogram, and NM whole body scan) that revealed numerous bone lesions, suspicious for metastatic disease, including osseous mass within the right iliac bone. (see reports below) MRI of RIGHT hip was ordered.    4/30/24 - Prostate MRI: 1. A 1.0 cm benign prostate utricle cyst. 2. No focal lesion suspicious for significant prostate cancer PIRADS 2. 3. Numerous bone lesions measuring up to 6.4 cm highly suspicious for osseous metastases, multiple myeloma, or primary osseous malignancy. Recommend further evaluation with pelvis radiographs, bone scan, and MRI of pelvis.   5/9/24 - CT A/P: 1. Aggressive osseous mass within the right iliac bone, differential diagnosis includes metastatic disease versus myeloma. 2. Small non-obstructing right intrarenal calculus.    6/3/24 - Bone Scan: 1. There is mild increased radionuclide uptake within the right hemipelvis corresponding to an osteolytic lesion see in this location on a recent CT. Finding is suspicious for malignancy. Suggest tissue diagnosis. This finding could be further characterized with MRI if clinically indicated. 2. Otherwise, unremarkable whole body bone scan.   8/12/24 - MRI of RIGHT Hip: 1. Lytic expansile lesion within the right iliac crest extending to the supraacetabular region with significant endosteal thinning, enhancement, and fluid between the lesion in the adjacent iliacus muscles which would suggest impending pathologic fracture. There is an incomplete fracture within the supraacetabular region. There is additional 12 mm lesion with the same signal characteristics in the posterior superior acetabulum.  Differential would include metastatic disease or myeloma.  This should be correlated with the patient's clinical history. 2. Cam deformity with moderate arthrosis, diffuse labral tear, and joint effusion.    8/20/24 - presented to Dr. Alvarez (hematology) for consultation for right pelvic bone mass. Patient is originally from [facility], Tuvaluan-speaking only, no relatives in the [place]. Lytic expansile lesion within the right iliac crest seen on MRI from 8/12/2024 raises the possibility of underlying multiple myeloma or metastatic neoplasm. More ominous, patient has what appears to be an impending pathologic right hip fracture, and incomplete fracture within the supra-acetabular region. No histologic diagnosis available. Needs evaluation by ortho and need work up for diagnosis. Referred to IR for biopsy.   As work up as outpatient was difficult, he was sent to [facility] for admission.   Admitted at [facility] from 8/23/24 - 9/1/24 to undergo workup:   8/22/24 - CT Angio Chest:  1. No pulmonary embolism. 2. The bones appear overall demineralized. Right iliac bone destructive lesion measuring 6.8 x 4.8 cm with extraosseous component extending into the iliacus muscles. Additionally, there is a lytic lesion within T7 vertebral body, questionable lytic lesion within T10 vertebral body, and fracture of the lateral right 9th rib.  3. The exact etiology is unclear, but multiple myeloma is favored over metastasis.    8/23/24 - CT Pelvis Bony: CT of pelvis demonstrates an expansile lytic lesion at the right iliac bone measuring up to 8.1 cm with cortical destruction. Additional ill-defined lytic lesion is seen at the posterior superior acetabulum measuring up to 2.2 cm.   8/26/24 - underwent Bone Marrow Biopsy and Bone Marrow Aspirate: Pathology - Plasma cell Myeloma, 40% plasma cells by differential counts in hemodilute, hypocellular aspirate smears.   While inpatient Mr. Durham was seen by Ortho - no acute surgical intervention and recommended radiation.  Inpatient radiation team also saw patient, as not in acute pain and is largely asymptomatic, he will follow up as an outpatient.    9/9/24 - PET Scan: 1. FDG avid lytic lesions compatible with active multiple myeloma. For follow-up, please request whole body imaging from skull vertex to toes. 2. New since the most recent CT chest nonavid groundglass changes in the right middle lobe, likely inflammatory process. Reassess on follow-up. Details: Bones/soft tissues:  FDG avid lytic lesions compatible with active multiple myeloma, a few examples below: Known right iliac bone lesion with expansile deformity, cortical disruptions and soft tissue component shows SUV 5.7; lesion extends to the right acetabular roof and posterior column of the right acetabulum. Smaller FDG avid lesion in the posterior aspect of the left acetabulum (image 190) with SUV 4.2. Other lesions include left 7th rib with lytic and soft tissue component (image 113) shows SUV 5.6 and less apparent lytic changes in the left 8hth rib with SUV 4.4. T7 with superior endplate sclerosis anteriorly showing minimal activity SUV 4.2 and posterior nonavid lytic lesion (image 101).  9/17/24 - saw Dr. Alvarez (hematology) in follow up as outpatient. Discussed diagnosis of multiple myeloma and PET scan results, compatible with active myeloma. To start on cytoreductive therapy with quadruple regimen (Bortezomib, Revlimid, dexamethasone and daratumumab), to complete 8 cycles prior to reevaluation. Discussed with patient the goal of treatment being remission and subsequent evaluation for autologous stem cell transplant. 9/17/24 - started on Systemic Treatment with Dr. Alvarez (hematology)   9/23/24 - presented in consultation to discuss radiation therapy options.  Mr. Durham is feeling well today.  He complains of pain in the right pelvis towards the back that goes down his leg.  He states it is worse when walking, denies any weakness or numbness of the leg.  He is using crutches to assist with ambulation.  He doesn't take anything for the pain, is about a 5 out of 10. He has started on systemic treatment with Dr. Alvarez (hematology) which he tolerated well, due for next treatment tomorrow.  His sister is here helping him, she lives in [place].  Patient expressed possibility of trying to move to [place] if his insurance will cover treatment there so that he can be closer to family.   in to see patient to discuss transportation options. Mr. Durham attended today with his sister. He has recently been diagnosed with multiple myeloma and has started chemotherapy with Dr Alvarez. He has had long term pain for many months in the right iliac bone region. Imaging has shown multiple lytic lesions throughout the axial skeleton, with the most significant lesion being in the right iliac bone lesion. We reviewed the imaging together. Offered him radiation to the iliac bone lesion to a dose of 30Gy in 10 fractions. Recommend that chemotherapy is held during radiation. We discussed logistics, possible acute and late side effects in detail and he has signed consent. He will return for simulation.  10/7/24 - 10/18/24: Received RADIATION Therapy to RIGHT Pelvis, 3000 cGy in 10 fx   11/25/24 - presents for post treatment evaluation appointment. Mr. Durham has seen improvement in his pain and walking since completing the radiation therapy.  Pain has improved. No other complications from the radiation therapy. He continues on systemic treatment with Dr. Alvarez (hematology), last seen in office on 10/29/24, next visit tomorrow.

## 2024-11-25 NOTE — REASON FOR VISIT
[Post-Treatment Evaluation] : post-treatment evaluation for [Other: ___] : [unfilled] [Other: ______] : provided by ODETTE [Other: _____] : [unfilled] [Interpreters_IDNumber] : [id number] [TWNoteComboBox1] : [place]oth[place]

## 2024-11-25 NOTE — HISTORY OF PRESENT ILLNESS
[FreeTextEntry1] : Mr. Durham is a 59-year-old East Timorese speaking male who presents for post treatment evaluation appointment.  He is accompanied by his sister for today's visit.   Diagnosis: Newly Diagnosed Plasma Cell Myeloma with RIGHT iliac destructive lesion  RADIATION Therapy: 10/7/24 - 10/18/24: Received RADIATION Therapy to RIGHT Pelvis, 3000 cGy in 10 fx   HPI: Mr. Durham originally presented to orthopedist, Dr. Espino, in July 2024 with complaints of mid/lower back pain and right hip pain for several months.  On review of his records, he was found to have lytic lesion in right ilium. As per note he had scans at [facility] (MRI Prostate, CT urogram, and NM whole body scan) that revealed numerous bone lesions, suspicious for metastatic disease, including osseous mass within the right iliac bone. (see reports below) MRI of RIGHT hip was ordered.    4/30/24 - Prostate MRI: 1. A 1.0 cm benign prostate utricle cyst. 2. No focal lesion suspicious for significant prostate cancer PIRADS 2. 3. Numerous bone lesions measuring up to 6.4 cm highly suspicious for osseous metastases, multiple myeloma, or primary osseous malignancy. Recommend further evaluation with pelvis radiographs, bone scan, and MRI of pelvis.   5/9/24 - CT A/P: 1. Aggressive osseous mass within the right iliac bone, differential diagnosis includes metastatic disease versus myeloma. 2. Small non-obstructing right intrarenal calculus.    6/3/24 - Bone Scan: 1. There is mild increased radionuclide uptake within the right hemipelvis corresponding to an osteolytic lesion see in this location on a recent CT. Finding is suspicious for malignancy. Suggest tissue diagnosis. This finding could be further characterized with MRI if clinically indicated. 2. Otherwise, unremarkable whole body bone scan.   8/12/24 - MRI of RIGHT Hip: 1. Lytic expansile lesion within the right iliac crest extending to the supraacetabular region with significant endosteal thinning, enhancement, and fluid between the lesion in the adjacent iliacus muscles which would suggest impending pathologic fracture. There is an incomplete fracture within the supraacetabular region. There is additional 12 mm lesion with the same signal characteristics in the posterior superior acetabulum.  Differential would include metastatic disease or myeloma.  This should be correlated with the patient's clinical history. 2. Cam deformity with moderate arthrosis, diffuse labral tear, and joint effusion.    8/20/24 - presented to Dr. Alvarez (hematology) for consultation for right pelvic bone mass. Patient is originally from [facility], [place]se-speaking only, no relatives in the [place]. Lytic expansile lesion within the right iliac crest seen on MRI from 8/12/2024 raises the possibility of underlying multiple myeloma or metastatic neoplasm. More ominous, patient has what appears to be an impending pathologic right hip fracture, and incomplete fracture within the supra-acetabular region. No histologic diagnosis available. Needs evaluation by ortho and need work up for diagnosis. Referred to IR for biopsy.   As work up as outpatient was difficult, he was sent to [facility] for admission.   Admitted at [facility] from 8/23/24 - 9/1/24 to undergo workup:   8/22/24 - CT Angio Chest:  1. No pulmonary embolism. 2. The bones appear overall demineralized. Right iliac bone destructive lesion measuring 6.8 x 4.8 cm with extraosseous component extending into the iliacus muscles. Additionally, there is a lytic lesion within T7 vertebral body, questionable lytic lesion within T10 vertebral body, and fracture of the lateral right 9th rib.  3. The exact etiology is unclear, but multiple myeloma is favored over metastasis.    8/23/24 - CT Pelvis Bony: CT of pelvis demonstrates an expansile lytic lesion at the right iliac bone measuring up to 8.1 cm with cortical destruction. Additional ill-defined lytic lesion is seen at the posterior superior acetabulum measuring up to 2.2 cm.   8/26/24 - underwent Bone Marrow Biopsy and Bone Marrow Aspirate: Pathology - Plasma cell Myeloma, 40% plasma cells by differential counts in hemodilute, hypocellular aspirate smears.   While inpatient Mr. Durham was seen by Ortho - no acute surgical intervention and recommended radiation.  Inpatient radiation team also saw patient, as not in acute pain and is largely asymptomatic, he will follow up as an outpatient.    9/9/24 - PET Scan: 1. FDG avid lytic lesions compatible with active multiple myeloma. For follow-up, please request whole body imaging from skull vertex to toes. 2. New since the most recent CT chest nonavid groundglass changes in the right middle lobe, likely inflammatory process. Reassess on follow-up. Details: Bones/soft tissues:  FDG avid lytic lesions compatible with active multiple myeloma, a few examples below: Known right iliac bone lesion with expansile deformity, cortical disruptions and soft tissue component shows SUV 5.7; lesion extends to the right acetabular roof and posterior column of the right acetabulum. Smaller FDG avid lesion in the posterior aspect of the left acetabulum (image 190) with SUV 4.2. Other lesions include left 7th rib with lytic and soft tissue component (image 113) shows SUV 5.6 and less apparent lytic changes in the left 8hth rib with SUV 4.4. T7 with superior endplate sclerosis anteriorly showing minimal activity SUV 4.2 and posterior nonavid lytic lesion (image 101).  9/17/24 - saw Dr. Alvarez (hematology) in follow up as outpatient. Discussed diagnosis of multiple myeloma and PET scan results, compatible with active myeloma. To start on cytoreductive therapy with quadruple regimen (Bortezomib, Revlimid, dexamethasone and daratumumab), to complete 8 cycles prior to reevaluation. Discussed with patient the goal of treatment being remission and subsequent evaluation for autologous stem cell transplant. 9/17/24 - started on Systemic Treatment with Dr. Alvarez (hematology)   9/23/24 - presented in consultation to discuss radiation therapy options.  Mr. Durham is feeling well today.  He complains of pain in the right pelvis towards the back that goes down his leg.  He states it is worse when walking, denies any weakness or numbness of the leg.  He is using crutches to assist with ambulation.  He doesn't take anything for the pain, is about a 5 out of 10. He has started on systemic treatment with Dr. Alvarez (hematology) which he tolerated well, due for next treatment tomorrow.  His sister is here helping him, she lives in [place].  Patient expressed possibility of trying to move to [place] if his insurance will cover treatment there so that he can be closer to family.   in to see patient to discuss transportation options. Mr. Durham attended today with his sister. He has recently been diagnosed with multiple myeloma and has started chemotherapy with Dr Alvarez. He has had long term pain for many months in the right iliac bone region. Imaging has shown multiple lytic lesions throughout the axial skeleton, with the most significant lesion being in the right iliac bone lesion. We reviewed the imaging together. Offered him radiation to the iliac bone lesion to a dose of 30Gy in 10 fractions. Recommend that chemotherapy is held during radiation. We discussed logistics, possible acute and late side effects in detail and he has signed consent. He will return for simulation.  10/7/24 - 10/18/24: Received RADIATION Therapy to RIGHT Pelvis, 3000 cGy in 10 fx   11/25/24 - presents for post treatment evaluation appointment. Mr. Durham has seen improvement in his pain and walking since completing the radiation therapy.  Pain has improved. No other complications from the radiation therapy. He continues on systemic treatment with Dr. Alvarez (hematology), last seen in office on 10/29/24, next visit tomorrow.

## 2024-11-26 NOTE — ASSESSMENT
[FreeTextEntry1] : Mr. BARAJAS 's questions were answered to his satisfaction.  He  expressed his  understanding and willingness to comply with the above recommendations, and  will return to the office in 1-2 weeks.

## 2024-11-26 NOTE — REASON FOR VISIT
[Follow-Up Visit] : a follow-up [[employer] ] : provided by [employer]   [FreeTextEntry2] : multiple myeloma [Interpreters_IDNumber] : [id number] [Interpreters_FullName] : Joey [TWNoteComboBox1] : Algerian

## 2024-11-26 NOTE — HISTORY OF PRESENT ILLNESS
[de-identified] : 59M, PMHx colon polyps, reporting right hip pain since August 2024, referred for medical oncology consultation of right pelvis bone mass.  CASE SYNOPSIS: 7/23/2024 colonoscopy( Dr. Emely Mccarthy)-findings: A 12 mm polyp in the sigmoid colon, semi- pedunculated, removed with hot snare External hemorrhoids grade 1  8/12/2024 MRI right hip without contrast ([facility])- IMPRESSION: 1.  Lytic expansile lesion within the right iliac crest extending to the supra-acetabular region with significant endosteal thinning, enhancement, and fluid between the lesion and the adjacent iliac us muscle which would suggest impending pathologic fracture.  There is an incomplete fracture within the supra-acetabular region.  There is additional 12 mm lesion with the same signal characteristics in the posterior superior acetabulum.  Differential would include metastatic disease or myeloma. 2.  Cam deformity with moderate arthrosis, diffuse labral tear and joint effusion.  8/23/2024 TTE- IMPRESSION:  1. Left ventricular cavity is normal in size. Left ventricular wall thickness is normal. Left ventricular systolic function is mildly decreased with an ejection fraction of 52 % by Ruiz's method of disks. There are no regional wall motion abnormalities seen.  2. There is mild (grade 1) left ventricular diastolic dysfunction, with normal left ventricular filling pressure.  3. Enlarged right ventricular cavity size, with normal wall thickness, and normal right ventricular systolic function.  4. Mild mitral regurgitation.  5. Normal left and right atrial size.  6. No pericardial effusion seen.  7. No prior echocardiogram is available for comparison.  8/26/2024 bone marrow studies plasma cell myeloma, 40% plasma cells,  positive. Flow cytometry-aberrant plasma cells (0.65% of cells, 87% of plasma cells) detected by flow cytometry analysis.  Congo red negative.  M spike 8.1 g/dL  9/9/2024 PET- IMPRESSION: 1. FDG avid lytic lesions with few examples above compatible with active multiple myeloma. For follow-up please request whole body imaging from skull vertex to toes. 2. New since the most recent CT chest nonavid groundglass changes in the right middle lobe, likely inflammatory process. Reassess on follow-up.  10/7 - 10/18/2024 completed 3000 cGy to right iliac destructive lesion. [FreeTextEntry1] : Workup in progress [de-identified] : Mr. BARAJAS is seen in the treatment room today.  He has complained of chest discomfort since this morning.  EKG shows sinus bradycardia (HR 44/min), no evidence of ischemia.  He has completed 3 weeks Revlimid and complains of peripheral neuropathy.  Pain in the right hip much improved after local palliative RT.  Denies night sweats, fevers, GI symptoms or dyspnea on exertion.  Compliant with antibiotics.  Appetite and weight preserved.  Here accompanied by his sister.

## 2024-12-13 NOTE — REASON FOR VISIT
[FreeTextEntry1] : CC: Cardiac Checkup HPI: 59M with multiple myeloma presenting for evaluation of palpitations related to bradycardia  ACS ruled out. CP deemed to be non-cardiac. No significant arrythmias noted in hospital.  Previous Zio shows sinus bradycardia 40s, with max .   CONCLUSIONS:  1. Left ventricular cavity is normal in size. Left ventricular wall thickness is normal. Left ventricular systolic function is normal with an ejection fraction of 63 % by Ruiz's method of disks. There are no regional wall motion abnormalities seen.  2. Normal left ventricular diastolic function.  3. Normal right ventricular cavity size and normal right ventricular systolic function.  4. Structurally normal mitral valve with normal leaflet excursion. There is mild mitral regurgitation.  ROS:  chest pain (-), dyspnea with exertion (-), orthopnea (-), edema (-), palpitations (-). All other systems were reviewed and are negative.   PFSH: Patient's current cardiovascular medications were reviewed and updated in chart. PMHx as described in HPI above. No prior cardiac testing available for review No family history of premature CAD, or SCD. SH: tobacco (-), alcohol (-), recreational drugs (-)

## 2024-12-13 NOTE — ASSESSMENT
[FreeTextEntry1] : Problems Assessed: 1. Multiple myeloma not in remission  2. Sinus bradycardia: asymptomatic   3. Chest pain: resolved.   Plan: - Marked sinus bradycardia 41-44, patient does not report any dizziness or lightheadedness. Echocardiogram with normal LV systolic function and no significant valvular disease.  - Patient has been off Revlimid, but remains sinus brardycardia. Will order ambulatory event monitor x7 days.  - Will give referral to cardio-oncology Dr. Dickey to discuss regarding resuming Revlimid   Follow-up: 3 months  ----------------------------------------------------------------------------------------- Billing Statement: ECG obtained in the diagnosis and treatment of assessed problems.  Total time spent on this encounter was 45 minutes. This includes non-face-to-face time before the visit when I reviewed relevant portions of the patient's medical record and time spent on documentation after the visit. During face-to-face time, I took a relevant history, examined the patient and explained differential diagnoses, relevant cardiac diagnoses, workup, and management plan.

## 2025-01-08 NOTE — REASON FOR VISIT
[Follow-Up Visit] : a follow-up [[employer] ] : provided by [employer]   [FreeTextEntry2] : multiple myeloma [Interpreters_IDNumber] : [id number] [Interpreters_FullName] : Joey [TWNoteComboBox1] : [place]ian

## 2025-01-08 NOTE — HISTORY OF PRESENT ILLNESS
[de-identified] : 59M, PMHx colon polyps, reporting right hip pain since August 2024, referred for medical oncology consultation of right pelvis bone mass.  CASE SYNOPSIS: 7/23/2024 colonoscopy( Dr. Emely Mccarthy)-findings: A 12 mm polyp in the sigmoid colon, semi- pedunculated, removed with hot snare External hemorrhoids grade 1  8/12/2024 MRI right hip without contrast ([facility])- IMPRESSION: 1.  Lytic expansile lesion within the right iliac crest extending to the supra-acetabular region with significant endosteal thinning, enhancement, and fluid between the lesion and the adjacent iliac us muscle which would suggest impending pathologic fracture.  There is an incomplete fracture within the supra-acetabular region.  There is additional 12 mm lesion with the same signal characteristics in the posterior superior acetabulum.  Differential would include metastatic disease or myeloma. 2.  Cam deformity with moderate arthrosis, diffuse labral tear and joint effusion.  8/23/2024 TTE- IMPRESSION:  1. Left ventricular cavity is normal in size. Left ventricular wall thickness is normal. Left ventricular systolic function is mildly decreased with an ejection fraction of 52 % by Ruiz's method of disks. There are no regional wall motion abnormalities seen.  2. There is mild (grade 1) left ventricular diastolic dysfunction, with normal left ventricular filling pressure.  3. Enlarged right ventricular cavity size, with normal wall thickness, and normal right ventricular systolic function.  4. Mild mitral regurgitation.  5. Normal left and right atrial size.  6. No pericardial effusion seen.  7. No prior echocardiogram is available for comparison.  8/26/2024 bone marrow studies plasma cell myeloma, 40% plasma cells,  positive. Flow cytometry-aberrant plasma cells (0.65% of cells, 87% of plasma cells) detected by flow cytometry analysis.  Congo red negative.  M spike 8.1 g/dL  9/9/2024 PET- IMPRESSION: 1. FDG avid lytic lesions with few examples above compatible with active multiple myeloma. For follow-up please request whole body imaging from skull vertex to toes. 2. New since the most recent CT chest nonavid groundglass changes in the right middle lobe, likely inflammatory process. Reassess on follow-up.  10/7 - 10/18/2024 completed 3000 cGy to right iliac destructive lesion.  12/11/2024-patient complaining of chest pain after taking lenalidomide.  Found bradycardic (HR 44/min); Revlimid discontinued.. [FreeTextEntry1] : Workup in progress [de-identified] : Since the last visit in November 2024, patient interrupted the Revlimid after being found severely bradycardic and reporting leg cramps and chest wall tightness.  Cardiac workup was unrevealing.  Patient took a break from treatment and move for the holidays to [place], where his sister, Silvia, lives.  They have just returned for follow-up today.  Patient ambulates with a cane, but denies falls or fractures.  Heart rate at 49/min today, still low.  He did not is any new medication; discontinued cardiac Holter monitoring after 1 week, with no significant findings.  Reviewed medication list.  Here to resume treatment with daratumumab/bortezomib.

## 2025-01-08 NOTE — HISTORY OF PRESENT ILLNESS
[de-identified] : 59M, PMHx colon polyps, reporting right hip pain since August 2024, referred for medical oncology consultation of right pelvis bone mass.  CASE SYNOPSIS: 7/23/2024 colonoscopy( Dr. Emely Mccarthy)-findings: A 12 mm polyp in the sigmoid colon, semi- pedunculated, removed with hot snare External hemorrhoids grade 1  8/12/2024 MRI right hip without contrast ([facility])- IMPRESSION: 1.  Lytic expansile lesion within the right iliac crest extending to the supra-acetabular region with significant endosteal thinning, enhancement, and fluid between the lesion and the adjacent iliac us muscle which would suggest impending pathologic fracture.  There is an incomplete fracture within the supra-acetabular region.  There is additional 12 mm lesion with the same signal characteristics in the posterior superior acetabulum.  Differential would include metastatic disease or myeloma. 2.  Cam deformity with moderate arthrosis, diffuse labral tear and joint effusion.  8/23/2024 TTE- IMPRESSION:  1. Left ventricular cavity is normal in size. Left ventricular wall thickness is normal. Left ventricular systolic function is mildly decreased with an ejection fraction of 52 % by Ruiz's method of disks. There are no regional wall motion abnormalities seen.  2. There is mild (grade 1) left ventricular diastolic dysfunction, with normal left ventricular filling pressure.  3. Enlarged right ventricular cavity size, with normal wall thickness, and normal right ventricular systolic function.  4. Mild mitral regurgitation.  5. Normal left and right atrial size.  6. No pericardial effusion seen.  7. No prior echocardiogram is available for comparison.  8/26/2024 bone marrow studies plasma cell myeloma, 40% plasma cells,  positive. Flow cytometry-aberrant plasma cells (0.65% of cells, 87% of plasma cells) detected by flow cytometry analysis.  Congo red negative.  M spike 8.1 g/dL  9/9/2024 PET- IMPRESSION: 1. FDG avid lytic lesions with few examples above compatible with active multiple myeloma. For follow-up please request whole body imaging from skull vertex to toes. 2. New since the most recent CT chest nonavid groundglass changes in the right middle lobe, likely inflammatory process. Reassess on follow-up.  10/7 - 10/18/2024 completed 3000 cGy to right iliac destructive lesion.  12/11/2024-patient complaining of chest pain after taking lenalidomide.  Found bradycardic (HR 44/min); Revlimid discontinued.. [FreeTextEntry1] : Workup in progress [de-identified] : Since the last visit in November 2024, patient interrupted the Revlimid after being found severely bradycardic and reporting leg cramps and chest wall tightness.  Cardiac workup was unrevealing.  Patient took a break from treatment and move for the holidays to [place], where his sister, Silvia, lives.  They have just returned for follow-up today.  Patient ambulates with a cane, but denies falls or fractures.  Heart rate at 49/min today, still low.  He did not is any new medication; discontinued cardiac Holter monitoring after 1 week, with no significant findings.  Reviewed medication list.  Here to resume treatment with daratumumab/bortezomib.

## 2025-01-08 NOTE — REASON FOR VISIT
[Follow-Up Visit] : a follow-up [Language Line ] : provided by Language Line   [FreeTextEntry2] : multiple myeloma [Interpreters_IDNumber] : [id number] [Interpreters_FullName] : Joey [TWNoteComboBox1] : Thai

## 2025-01-08 NOTE — HISTORY OF PRESENT ILLNESS
[de-identified] : 59M, PMHx colon polyps, reporting right hip pain since August 2024, referred for medical oncology consultation of right pelvis bone mass.  CASE SYNOPSIS: 7/23/2024 colonoscopy( Dr. Emely Mccarthy)-findings: A 12 mm polyp in the sigmoid colon, semi- pedunculated, removed with hot snare External hemorrhoids grade 1  8/12/2024 MRI right hip without contrast ([facility])- IMPRESSION: 1.  Lytic expansile lesion within the right iliac crest extending to the supra-acetabular region with significant endosteal thinning, enhancement, and fluid between the lesion and the adjacent iliac us muscle which would suggest impending pathologic fracture.  There is an incomplete fracture within the supra-acetabular region.  There is additional 12 mm lesion with the same signal characteristics in the posterior superior acetabulum.  Differential would include metastatic disease or myeloma. 2.  Cam deformity with moderate arthrosis, diffuse labral tear and joint effusion.  8/23/2024 TTE- IMPRESSION:  1. Left ventricular cavity is normal in size. Left ventricular wall thickness is normal. Left ventricular systolic function is mildly decreased with an ejection fraction of 52 % by Ruiz's method of disks. There are no regional wall motion abnormalities seen.  2. There is mild (grade 1) left ventricular diastolic dysfunction, with normal left ventricular filling pressure.  3. Enlarged right ventricular cavity size, with normal wall thickness, and normal right ventricular systolic function.  4. Mild mitral regurgitation.  5. Normal left and right atrial size.  6. No pericardial effusion seen.  7. No prior echocardiogram is available for comparison.  8/26/2024 bone marrow studies plasma cell myeloma, 40% plasma cells,  positive. Flow cytometry-aberrant plasma cells (0.65% of cells, 87% of plasma cells) detected by flow cytometry analysis.  Congo red negative.  M spike 8.1 g/dL  9/9/2024 PET- IMPRESSION: 1. FDG avid lytic lesions with few examples above compatible with active multiple myeloma. For follow-up please request whole body imaging from skull vertex to toes. 2. New since the most recent CT chest nonavid groundglass changes in the right middle lobe, likely inflammatory process. Reassess on follow-up.  10/7 - 10/18/2024 completed 3000 cGy to right iliac destructive lesion.  12/11/2024-patient complaining of chest pain after taking lenalidomide.  Found bradycardic (HR 44/min); Revlimid discontinued.. [FreeTextEntry1] : Workup in progress [de-identified] : Since the last visit in November 2024, patient interrupted the Revlimid after being found severely bradycardic and reporting leg cramps and chest wall tightness.  Cardiac workup was unrevealing.  Patient took a break from treatment and move for the holidays to [place], where his sister, Silvia, lives.  They have just returned for follow-up today.  Patient ambulates with a cane, but denies falls or fractures.  Heart rate at 49/min today, still low.  He did not is any new medication; discontinued cardiac Holter monitoring after 1 week, with no significant findings.  Reviewed medication list.  Here to resume treatment with daratumumab/bortezomib.   Alert

## 2025-01-08 NOTE — REASON FOR VISIT
[Follow-Up Visit] : a follow-up [Language Line ] : provided by Language Line   [FreeTextEntry2] : multiple myeloma [Interpreters_IDNumber] : [id number] [Interpreters_FullName] : Joey [TWNoteComboBox1] : Armenian

## 2025-02-03 NOTE — ASSESSMENT
[FreeTextEntry1] : Mr. BARAJAS 's questions were answered to his satisfaction.  He  expressed his  understanding and willingness to comply with the above recommendations.

## 2025-02-03 NOTE — HISTORY OF PRESENT ILLNESS
[de-identified] : 59M, PMHx colon polyps, reporting right hip pain since August 2024, referred for medical oncology consultation of right pelvis bone mass.  CASE SYNOPSIS: 7/23/2024 colonoscopy( Dr. Emely Mccarthy)-findings: A 12 mm polyp in the sigmoid colon, semi- pedunculated, removed with hot snare External hemorrhoids grade 1  8/12/2024 MRI right hip without contrast ([facility])- IMPRESSION: 1.  Lytic expansile lesion within the right iliac crest extending to the supra-acetabular region with significant endosteal thinning, enhancement, and fluid between the lesion and the adjacent iliac us muscle which would suggest impending pathologic fracture.  There is an incomplete fracture within the supra-acetabular region.  There is additional 12 mm lesion with the same signal characteristics in the posterior superior acetabulum.  Differential would include metastatic disease or myeloma. 2.  Cam deformity with moderate arthrosis, diffuse labral tear and joint effusion.  8/23/2024 TTE- IMPRESSION:  1. Left ventricular cavity is normal in size. Left ventricular wall thickness is normal. Left ventricular systolic function is mildly decreased with an ejection fraction of 52 % by Ruiz's method of disks. There are no regional wall motion abnormalities seen.  2. There is mild (grade 1) left ventricular diastolic dysfunction, with normal left ventricular filling pressure.  3. Enlarged right ventricular cavity size, with normal wall thickness, and normal right ventricular systolic function.  4. Mild mitral regurgitation.  5. Normal left and right atrial size.  6. No pericardial effusion seen.  7. No prior echocardiogram is available for comparison.  8/26/2024 bone marrow studies plasma cell myeloma, 40% plasma cells,  positive. Flow cytometry-aberrant plasma cells (0.65% of cells, 87% of plasma cells) detected by flow cytometry analysis.  Congo red negative.  M spike 8.1 g/dL  9/9/2024 PET- IMPRESSION: 1. FDG avid lytic lesions with few examples above compatible with active multiple myeloma. For follow-up please request whole body imaging from skull vertex to toes. 2. New since the most recent CT chest nonavid groundglass changes in the right middle lobe, likely inflammatory process. Reassess on follow-up.  10/7 - 10/18/2024 completed 3000 cGy to right iliac destructive lesion.  12/11/2024-patient complaining of chest pain after taking lenalidomide.  Found bradycardic (HR 44/min); Revlimid discontinued.. [FreeTextEntry1] : Workup in progress [de-identified] : Short-term follow-up visit.  After spending the holidays with his sister in [place], patient resumed systemic chemotherapy with daratumumab and Velcade.  Repeat protein electrophoresis showed a significant decrease in immunoglobulin lambda free light chain from 126.42 in September 2024 to 0.38 in January 2025.  Mr. BARAJAS is doing well, reporting no changes in medical, surgical or social history since last visit a month ago.  Denies recent ED, urgent care visits or hospitalizations.  Medication list unchanged. Denies night sweats, fevers or other B symptoms.  Appetite and weight preserved.

## 2025-02-03 NOTE — REASON FOR VISIT
[Follow-Up Visit] : a follow-up [Language Line ] : provided by Language Line   [Family Member] : family member [FreeTextEntry2] : multiple myeloma [Interpreters_IDNumber] : [id number] [Interpreters_FullName] : Joey [TWNoteComboBox1] : Faroese

## 2025-02-03 NOTE — REASON FOR VISIT
[Follow-Up Visit] : a follow-up [Language Line ] : provided by Language Line   [Family Member] : family member [FreeTextEntry2] : multiple myeloma [Interpreters_IDNumber] : [id number] [Interpreters_FullName] : Joey [TWNoteComboBox1] : Honduran

## 2025-02-03 NOTE — HISTORY OF PRESENT ILLNESS
[de-identified] : 59M, PMHx colon polyps, reporting right hip pain since August 2024, referred for medical oncology consultation of right pelvis bone mass.  CASE SYNOPSIS: 7/23/2024 colonoscopy( Dr. Emely Mccarthy)-findings: A 12 mm polyp in the sigmoid colon, semi- pedunculated, removed with hot snare External hemorrhoids grade 1  8/12/2024 MRI right hip without contrast ([facility])- IMPRESSION: 1.  Lytic expansile lesion within the right iliac crest extending to the supra-acetabular region with significant endosteal thinning, enhancement, and fluid between the lesion and the adjacent iliac us muscle which would suggest impending pathologic fracture.  There is an incomplete fracture within the supra-acetabular region.  There is additional 12 mm lesion with the same signal characteristics in the posterior superior acetabulum.  Differential would include metastatic disease or myeloma. 2.  Cam deformity with moderate arthrosis, diffuse labral tear and joint effusion.  8/23/2024 TTE- IMPRESSION:  1. Left ventricular cavity is normal in size. Left ventricular wall thickness is normal. Left ventricular systolic function is mildly decreased with an ejection fraction of 52 % by Ruiz's method of disks. There are no regional wall motion abnormalities seen.  2. There is mild (grade 1) left ventricular diastolic dysfunction, with normal left ventricular filling pressure.  3. Enlarged right ventricular cavity size, with normal wall thickness, and normal right ventricular systolic function.  4. Mild mitral regurgitation.  5. Normal left and right atrial size.  6. No pericardial effusion seen.  7. No prior echocardiogram is available for comparison.  8/26/2024 bone marrow studies plasma cell myeloma, 40% plasma cells,  positive. Flow cytometry-aberrant plasma cells (0.65% of cells, 87% of plasma cells) detected by flow cytometry analysis.  Congo red negative.  M spike 8.1 g/dL  9/9/2024 PET- IMPRESSION: 1. FDG avid lytic lesions with few examples above compatible with active multiple myeloma. For follow-up please request whole body imaging from skull vertex to toes. 2. New since the most recent CT chest nonavid groundglass changes in the right middle lobe, likely inflammatory process. Reassess on follow-up.  10/7 - 10/18/2024 completed 3000 cGy to right iliac destructive lesion.  12/11/2024-patient complaining of chest pain after taking lenalidomide.  Found bradycardic (HR 44/min); Revlimid discontinued.. [FreeTextEntry1] : Workup in progress [de-identified] : Short-term follow-up visit.  After spending the holidays with his sister in [place], patient resumed systemic chemotherapy with daratumumab and Velcade.  Repeat protein electrophoresis showed a significant decrease in immunoglobulin lambda free light chain from 126.42 in September 2024 to 0.38 in January 2025.  Mr. BARAJAS is doing well, reporting no changes in medical, surgical or social history since last visit a month ago.  Denies recent ED, urgent care visits or hospitalizations.  Medication list unchanged. Denies night sweats, fevers or other B symptoms.  Appetite and weight preserved.

## 2025-02-06 NOTE — REVIEW OF SYSTEMS
[Chest Discomfort] : chest discomfort [Leg Claudication] : no intermittent leg claudication [PND] : no PND [Negative] : Heme/Lymph [Feeling Fatigued] : not feeling fatigued [SOB] : no shortness of breath [Dyspnea on exertion] : not dyspnea during exertion [Lower Ext Edema] : no extremity edema [Palpitations] : no palpitations [Orthopnea] : no orthopnea [Syncope] : no syncope [Dizziness] : no dizziness

## 2025-02-06 NOTE — HISTORY OF PRESENT ILLNESS
[FreeTextEntry1] : The patient was diagnosed with multiple myeloma after presenting with hip pain when a lytic bone lesion in the right iliac crest. He was noted to have bradycardia in the 40s while hospitalized in 2024 prior to beginning systemic treatment. He started bortezomib, Revlimid, dexamethasone, and daratumumab on 2024. He received radiation therapy to the right iliac lesion.  He complained of chest pain and was admitted to [facility] on 24. ACS was rule out and he was discharged the following day. ECG showed sinus bradycardia.  He was seen by Dr. Mclean for follow up and a Zio monitor showed bradycardia with aidee to 29 bpm (while asleep).  The patient was noted to have lytic lesion in the ribs.  On 2024, the patient complained of chest pain and EKG showed sinus bradycardia 44 bpm. Lenalidomide was discontinued.  He denies fatigue, palpitations, lightheadedness, syncope. He reports no symptoms associated with the heart rate.  He continues to have intermittent chest pain; he feels throbbing across his left chest that does not radiate. It occurs without apparent provocation while at rest and abates on its own.  Family history: - Father smoker, denies any cardiac history. - Mother with colon CA  Social History: - From [facility] - Lives with his sister in [facility]. His sister accompanied him today - Worked in construction until myeloma diagnosis - non-smoker, no ETOH, drug use  Cardiovascular Summary: ---------------------------------------------- EC2025: sinus bradycardia 44 bpm, otherwise normal ECG ---------------------------------------------- Echo: 2024: EF 63 %, normal RV size, mild LAE, no structural abnormalities, normal LV wall thickness 2024: LVEF 52%, no regional WMA. G1 DD, enlarged RV cavity size nml function, mild MR.  ---------------------------------------------- Stress: ---------------------------------------------- CT/MRI ---------------------------------------------- Remote/ambulatory rhythm monitorin2024: Min HR 29, Max 146, average 45. Predominantly sinus rhythm, with SVT 5 beats Rare VEs 2024: HR , avg 54, 7 beat SVT, rare VPCs

## 2025-02-06 NOTE — HISTORY OF PRESENT ILLNESS
[FreeTextEntry1] : The patient was diagnosed with multiple myeloma after presenting with hip pain when a lytic bone lesion in the right iliac crest. He was noted to have bradycardia in the 40s while hospitalized in 2024 prior to beginning systemic treatment. He started bortezomib, Revlimid, dexamethasone, and daratumumab on 2024. He received radiation therapy to the right iliac lesion.  He complained of chest pain and was admitted to [facility] on 24. ACS was rule out and he was discharged the following day. ECG showed sinus bradycardia.  He was seen by Dr. Mclean for follow up and a Zio monitor showed bradycardia with aidee to 29 bpm (while asleep).  The patient was noted to have lytic lesion in the ribs.  On 2024, the patient complained of chest pain and EKG showed sinus bradycardia 44 bpm. Lenalidomide was discontinued.  He denies fatigue, palpitations, lightheadedness, syncope. He reports no symptoms associated with the heart rate.  He continues to have intermittent chest pain; he feels throbbing across his left chest that does not radiate. It occurs without apparent provocation while at rest and abates on its own.  Family history: - Father smoker, denies any cardiac history. - Mother with colon CA  Social History: - From [facility] - Lives with his sister in [place]. His sister accompanied him today - Worked in construction until myeloma diagnosis - non-smoker, no ETOH, drug use  Cardiovascular Summary: ---------------------------------------------- EC2025: sinus bradycardia 44 bpm, otherwise normal ECG ---------------------------------------------- Echo: 2024: EF 63 %, normal RV size, mild LAE, no structural abnormalities, normal LV wall thickness 2024: LVEF 52%, no regional WMA. G1 DD, enlarged RV cavity size nml function, mild MR.  ---------------------------------------------- Stress: ---------------------------------------------- CT/MRI ---------------------------------------------- Remote/ambulatory rhythm monitorin2024: Min HR 29, Max 146, average 45. Predominantly sinus rhythm, with SVT 5 beats Rare VEs 2024: HR , avg 54, 7 beat SVT, rare VPCs

## 2025-02-06 NOTE — END OF VISIT
[Time Spent: ___ minutes] : I have spent [unfilled] minutes of time on the encounter which excludes teaching and separately reported services. Body Location Override (Optional - Billing Will Still Be Based On Selected Body Map Location If Applicable): back Detail Level: Detailed Cpt Desired:  Showing: fungal hyphal elements: negative Koh Intro Text (From The.....): A KOH prep was ordered and evaluated from the Koh Procedure Text (Tissue Harvesting Technique): A 15-blade scalpel was used to scrape the skin. The skin scrapings were placed on a glass slide, covered with a coverslip and a KOH solution was applied.

## 2025-02-06 NOTE — REASON FOR VISIT
[CV Risk Factors and Non-Cardiac Disease] : CV risk factors and non-cardiac disease [Family Member] : family member [Language Line ] : provided by Language Line   [Time Spent: ____ minutes] : Total time spent using  services: [unfilled] minutes. The patient's primary language is not English thus required  services. [FreeTextEntry3] : Dr. Alvarez/Dr. Mclean [Interpreters_IDNumber] : [id number] [Interpreters_FullName] : Ashia [TWNoteComboBox1] : Malawian [FreeTextEntry1] : ================================================================= ALEX BARAJAS is a 59 year old man with a history of IgG lambda multiple myeloma who is seen for bradycardia.  Prior Cancer Treatments: ------------------------------------------------------------------------ Chemo/targeted therapy: 9/17/2024-12/2024: dexamethasone, Revlimid, subcutaneous bortezomib, daratumumab 12/2024: daratumumab + bortezomib   ------------------------------------------------------------------------ Surgery: ------------------------------------------------------------------------ Radiation: 10/18/2024 completed 3000 cGy to right iliac bone lesion.

## 2025-02-06 NOTE — HISTORY OF PRESENT ILLNESS
[FreeTextEntry1] : The patient was diagnosed with multiple myeloma after presenting with hip pain when a lytic bone lesion in the right iliac crest. He was noted to have bradycardia in the 40s while hospitalized in 2024 prior to beginning systemic treatment. He started bortezomib, Revlimid, dexamethasone, and daratumumab on 2024. He received radiation therapy to the right iliac lesion.  He complained of chest pain and was admitted to [facility] on 24. ACS was rule out and he was discharged the following day. ECG showed sinus bradycardia.  He was seen by Dr. Mclean for follow up and a Zio monitor showed bradycardia with aidee to 29 bpm (while asleep).  The patient was noted to have lytic lesion in the ribs.  On 2024, the patient complained of chest pain and EKG showed sinus bradycardia 44 bpm. Lenalidomide was discontinued.  He denies fatigue, palpitations, lightheadedness, syncope. He reports no symptoms associated with the heart rate.  He continues to have intermittent chest pain; he feels throbbing across his left chest that does not radiate. It occurs without apparent provocation while at rest and abates on its own.  Family history: - Father smoker, denies any cardiac history. - Mother with colon CA  Social History: - From [facility] - Lives with his sister in [place]. His sister accompanied him today - Worked in construction until myeloma diagnosis - non-smoker, no ETOH, drug use  Cardiovascular Summary: ---------------------------------------------- EC2025: sinus bradycardia 44 bpm, otherwise normal ECG ---------------------------------------------- Echo: 2024: EF 63 %, normal RV size, mild LAE, no structural abnormalities, normal LV wall thickness 2024: LVEF 52%, no regional WMA. G1 DD, enlarged RV cavity size nml function, mild MR.  ---------------------------------------------- Stress: ---------------------------------------------- CT/MRI ---------------------------------------------- Remote/ambulatory rhythm monitorin2024: Min HR 29, Max 146, average 45. Predominantly sinus rhythm, with SVT 5 beats Rare VEs 2024: HR , avg 54, 7 beat SVT, rare VPCs

## 2025-02-06 NOTE — REASON FOR VISIT
[CV Risk Factors and Non-Cardiac Disease] : CV risk factors and non-cardiac disease [Family Member] : family member [Language Line ] : provided by Language Line   [Time Spent: ____ minutes] : Total time spent using  services: [unfilled] minutes. The patient's primary language is not English thus required  services. [FreeTextEntry3] : Dr. Alvarez/Dr. Mclean [Interpreters_IDNumber] : [id number] [Interpreters_FullName] : Ashia [TWNoteComboBox1] : Kosovan [FreeTextEntry1] : ================================================================= ALEX BARAJAS is a 59 year old man with a history of IgG lambda multiple myeloma who is seen for bradycardia.  Prior Cancer Treatments: ------------------------------------------------------------------------ Chemo/targeted therapy: 9/17/2024-12/2024: dexamethasone, Revlimid, subcutaneous bortezomib, daratumumab 12/2024: daratumumab + bortezomib   ------------------------------------------------------------------------ Surgery: ------------------------------------------------------------------------ Radiation: 10/18/2024 completed 3000 cGy to right iliac bone lesion.

## 2025-02-06 NOTE — PHYSICAL EXAM
[FreeTextEntry1] : Very pleasant 66-year-old gentleman who presents for follow-up of concern for bladder mass.  He reports that he underwent a cystoscopy with another urologist in the past and reports that he was very painful.  He reports that he was told that he had a bladder mass at that time.  He was recommended to undergo a CT scan, however elected to proceed with a renal ultrasound instead.  Renal ultrasound today demonstrated no kidney stones, hydronephrosis, renal masses.  Prior urine cytology was negative for high-grade urothelial carcinoma, however did demonstrate clusters of urothelial cells.  A1c was noted to be 15.5. [Well Developed] : well developed [Well Nourished] : well nourished [No Acute Distress] : no acute distress [Normal Conjunctiva] : normal conjunctiva [Normal Venous Pressure] : normal venous pressure [No Carotid Bruit] : no carotid bruit [Normal S1, S2] : normal S1, S2 [No Murmur] : no murmur [No Rub] : no rub [Clear Lung Fields] : clear lung fields [Good Air Entry] : good air entry [No Respiratory Distress] : no respiratory distress  [Normal] : alert and oriented, normal memory [No Gallop] : no gallop [Normal Gait] : normal gait [No Edema] : no edema [Moves all extremities] : moves all extremities

## 2025-02-06 NOTE — ASSESSMENT
[FreeTextEntry1] : ================================== 59 year old man with IgG lambda multiple myeloma and baseline sinus bradycardia which may have become more pronounced following treatment with lenalidomide with rates < 30. He is not symptomatic of bradycardia and has not had syncope, but does have chest pain. ECG is otherwise normal and echocardiogram showed normal LV function and normal wall thickness.   Revlimid was discontinued on account of presumed toxicity, with bradycardia captured on Holter down to 29. Patient throughout has been asymptomatic but despite treatment discontinuation he continues to have sinus bradycardia captured in multiple clinic visits and EKG today in the 40's.  No recent TSH.  Chest pain may be explained by bony disease, but no recent stress test.  BP is elevated.  Plan: - check TSH - exercise treadmill test to evaluate for chronotropic competence, ischemia - in future visits will address hypertension, likely primary essential but may be further exacerbated by proteosome inhibitors/steroids - check pro-BNP for risk stratification given proteasome inhibitor therapy - Pending above, asymptomatic bradycardia is not a contraindication to lenalidomide.  The patient will follow up with Dr. Blake and Dr. Collado.  Above was discussed with the patient and his sister using the telephonic  and all questions were answered to the best of my ability and to their apparent satisfaction. The prior records and consultant reports were reviewed in detail in order to formulate the assessment and plan of care.

## 2025-02-06 NOTE — REASON FOR VISIT
[CV Risk Factors and Non-Cardiac Disease] : CV risk factors and non-cardiac disease [Family Member] : family member [Language Line ] : provided by Language Line   [Time Spent: ____ minutes] : Total time spent using  services: [unfilled] minutes. The patient's primary language is not English thus required  services. [FreeTextEntry3] : Dr. Alvarez/Dr. Mclean [Interpreters_IDNumber] : [id number] [Interpreters_FullName] : Ashia [TWNoteComboBox1] : Vietnamese [FreeTextEntry1] : ================================================================= ALEX BARAJAS is a 59 year old man with a history of IgG lambda multiple myeloma who is seen for bradycardia.  Prior Cancer Treatments: ------------------------------------------------------------------------ Chemo/targeted therapy: 9/17/2024-12/2024: dexamethasone, Revlimid, subcutaneous bortezomib, daratumumab 12/2024: daratumumab + bortezomib   ------------------------------------------------------------------------ Surgery: ------------------------------------------------------------------------ Radiation: 10/18/2024 completed 3000 cGy to right iliac bone lesion.

## 2025-02-06 NOTE — REASON FOR VISIT
[CV Risk Factors and Non-Cardiac Disease] : CV risk factors and non-cardiac disease [Family Member] : family member [Language Line ] : provided by Language Line   [Time Spent: ____ minutes] : Total time spent using  services: [unfilled] minutes. The patient's primary language is not English thus required  services. [FreeTextEntry3] : Dr. Alvarez/Dr. Mclean [Interpreters_IDNumber] : [id number] [Interpreters_FullName] : Ashia [TWNoteComboBox1] : Vietnamese [FreeTextEntry1] : ================================================================= ALEX BARAJAS is a 59 year old man with a history of IgG lambda multiple myeloma who is seen for bradycardia.  Prior Cancer Treatments: ------------------------------------------------------------------------ Chemo/targeted therapy: 9/17/2024-12/2024: dexamethasone, Revlimid, subcutaneous bortezomib, daratumumab 12/2024: daratumumab + bortezomib   ------------------------------------------------------------------------ Surgery: ------------------------------------------------------------------------ Radiation: 10/18/2024 completed 3000 cGy to right iliac bone lesion.

## 2025-02-06 NOTE — PHYSICAL EXAM
[Well Developed] : well developed [Well Nourished] : well nourished [No Acute Distress] : no acute distress [Normal Conjunctiva] : normal conjunctiva [Normal Venous Pressure] : normal venous pressure [No Carotid Bruit] : no carotid bruit [Normal S1, S2] : normal S1, S2 [No Murmur] : no murmur [No Rub] : no rub [Clear Lung Fields] : clear lung fields [Good Air Entry] : good air entry [No Respiratory Distress] : no respiratory distress  [Normal] : alert and oriented, normal memory [No Gallop] : no gallop [Normal Gait] : normal gait [No Edema] : no edema [Moves all extremities] : moves all extremities

## 2025-02-06 NOTE — HISTORY OF PRESENT ILLNESS
[FreeTextEntry1] : The patient was diagnosed with multiple myeloma after presenting with hip pain when a lytic bone lesion in the right iliac crest. He was noted to have bradycardia in the 40s while hospitalized in 2024 prior to beginning systemic treatment. He started bortezomib, Revlimid, dexamethasone, and daratumumab on 2024. He received radiation therapy to the right iliac lesion.  He complained of chest pain and was admitted to [facility] on 24. ACS was rule out and he was discharged the following day. ECG showed sinus bradycardia.  He was seen by Dr. Mclean for follow up and a Zio monitor showed bradycardia with aidee to 29 bpm (while asleep).  The patient was noted to have lytic lesion in the ribs.  On 2024, the patient complained of chest pain and EKG showed sinus bradycardia 44 bpm. Lenalidomide was discontinued.  He denies fatigue, palpitations, lightheadedness, syncope. He reports no symptoms associated with the heart rate.  He continues to have intermittent chest pain; he feels throbbing across his left chest that does not radiate. It occurs without apparent provocation while at rest and abates on its own.  Family history: - Father smoker, denies any cardiac history. - Mother with colon CA  Social History: - From [facility] - Lives with his sister in [facility]. His sister accompanied him today - Worked in construction until myeloma diagnosis - non-smoker, no ETOH, drug use  Cardiovascular Summary: ---------------------------------------------- EC2025: sinus bradycardia 44 bpm, otherwise normal ECG ---------------------------------------------- Echo: 2024: EF 63 %, normal RV size, mild LAE, no structural abnormalities, normal LV wall thickness 2024: LVEF 52%, no regional WMA. G1 DD, enlarged RV cavity size nml function, mild MR.  ---------------------------------------------- Stress: ---------------------------------------------- CT/MRI ---------------------------------------------- Remote/ambulatory rhythm monitorin2024: Min HR 29, Max 146, average 45. Predominantly sinus rhythm, with SVT 5 beats Rare VEs 2024: HR , avg 54, 7 beat SVT, rare VPCs

## 2025-03-03 NOTE — REASON FOR VISIT
[Follow-Up Visit] : a follow-up [Language Line ] : provided by Language Line   [FreeTextEntry2] : multiple myeloma [Interpreters_IDNumber] : [id number] [Interpreters_FullName] : Joey [TWNoteComboBox1] : Jordanian

## 2025-03-03 NOTE — HISTORY OF PRESENT ILLNESS
[de-identified] : 59M, PMHx colon polyps, reporting right hip pain since August 2024, referred for medical oncology consultation of right pelvis bone mass.  CASE SYNOPSIS: 7/23/2024 colonoscopy( Dr. Emely Mccarthy)-findings: A 12 mm polyp in the sigmoid colon, semi- pedunculated, removed with hot snare External hemorrhoids grade 1  8/12/2024 MRI right hip without contrast ([facility])- IMPRESSION: 1.  Lytic expansile lesion within the right iliac crest extending to the supra-acetabular region with significant endosteal thinning, enhancement, and fluid between the lesion and the adjacent iliac us muscle which would suggest impending pathologic fracture.  There is an incomplete fracture within the supra-acetabular region.  There is additional 12 mm lesion with the same signal characteristics in the posterior superior acetabulum.  Differential would include metastatic disease or myeloma. 2.  Cam deformity with moderate arthrosis, diffuse labral tear and joint effusion.  8/23/2024 TTE- IMPRESSION:  1. Left ventricular cavity is normal in size. Left ventricular wall thickness is normal. Left ventricular systolic function is mildly decreased with an ejection fraction of 52 % by Ruiz's method of disks. There are no regional wall motion abnormalities seen.  2. There is mild (grade 1) left ventricular diastolic dysfunction, with normal left ventricular filling pressure.  3. Enlarged right ventricular cavity size, with normal wall thickness, and normal right ventricular systolic function.  4. Mild mitral regurgitation.  5. Normal left and right atrial size.  6. No pericardial effusion seen.  7. No prior echocardiogram is available for comparison.  8/26/2024 bone marrow studies plasma cell myeloma, 40% plasma cells,  positive. Flow cytometry-aberrant plasma cells (0.65% of cells, 87% of plasma cells) detected by flow cytometry analysis.  Congo red negative.  M spike 8.1 g/dL  9/9/2024 PET- IMPRESSION: 1. FDG avid lytic lesions with few examples above compatible with active multiple myeloma. For follow-up please request whole body imaging from skull vertex to toes. 2. New since the most recent CT chest nonavid groundglass changes in the right middle lobe, likely inflammatory process. Reassess on follow-up.  10/7 - 10/18/2024 completed 3000 cGy to right iliac destructive lesion.  12/11/2024-patient complaining of chest pain after taking lenalidomide.  Found bradycardic (HR 44/min); Revlimid discontinued.. [FreeTextEntry1] : Workup in progress [de-identified] : Reported no changes in clinical status; last seen in office 4 weeks ago.  He was evaluated by Dr. Steven (cardiology) for persistent bradycardia.  States he continues to have intermittent chest pain described as throbbing, across his left chest, not radiating.  Recommended treadmill exercise test to evaluate for ischemia.  Physical examination unchanged, as patient continues daratumumab and Velcade therapy.  Repeat protein electrophoresis showed response with decreased immunoglobulin lambda free light chain.  January 2025.  Medication list reviewed and updated.

## 2025-03-31 NOTE — HISTORY OF PRESENT ILLNESS
[de-identified] : 60M, PMHx colon polyps, reporting right hip pain since August 2024, referred for medical oncology consultation of right pelvis bone mass.  CASE SYNOPSIS: 7/23/2024 colonoscopy( Dr. Emely Mccarthy)-findings: A 12 mm polyp in the sigmoid colon, semi- pedunculated, removed with hot snare External hemorrhoids grade 1  8/12/2024 MRI right hip without contrast ([facility])- IMPRESSION: 1.  Lytic expansile lesion within the right iliac crest extending to the supra-acetabular region with significant endosteal thinning, enhancement, and fluid between the lesion and the adjacent iliac us muscle which would suggest impending pathologic fracture.  There is an incomplete fracture within the supra-acetabular region.  There is additional 12 mm lesion with the same signal characteristics in the posterior superior acetabulum.  Differential would include metastatic disease or myeloma. 2.  Cam deformity with moderate arthrosis, diffuse labral tear and joint effusion.  8/23/2024 TTE- IMPRESSION:  1. Left ventricular cavity is normal in size. Left ventricular wall thickness is normal. Left ventricular systolic function is mildly decreased with an ejection fraction of 52 % by Ruiz's method of disks. There are no regional wall motion abnormalities seen.  2. There is mild (grade 1) left ventricular diastolic dysfunction, with normal left ventricular filling pressure.  3. Enlarged right ventricular cavity size, with normal wall thickness, and normal right ventricular systolic function.  4. Mild mitral regurgitation.  5. Normal left and right atrial size.  6. No pericardial effusion seen.  7. No prior echocardiogram is available for comparison.  8/26/2024 bone marrow studies plasma cell myeloma, 40% plasma cells,  positive. Flow cytometry-aberrant plasma cells (0.65% of cells, 87% of plasma cells) detected by flow cytometry analysis.  Congo red negative.  M spike 8.1 g/dL  9/9/2024 PET- IMPRESSION: 1. FDG avid lytic lesions with few examples above compatible with active multiple myeloma. For follow-up please request whole body imaging from skull vertex to toes. 2. New since the most recent CT chest nonavid groundglass changes in the right middle lobe, likely inflammatory process. Reassess on follow-up.  10/7 - 10/18/2024 completed 3000 cGy to right iliac destructive lesion.  12/11/2024-patient complaining of chest pain after taking lenalidomide.  Found bradycardic (HR 44/min); Revlimid discontinued..  9/17/2024 starts cycle #1 VDR + Darzalex  [FreeTextEntry1] : Workup in progress [de-identified] : Since his last visit a month ago, patient has been well, with no significant side effects from treatment.  Remains bradycardic, but asymptomatic.  Has regular follow-up with cardiology, but denies chest pain. Complains of pain in right leg pain, mostly with weight bearing.  Continues treatment with daratumumab and Velcade with no significant neurotoxicity or cytopenias.  Comprehensive metabolic panel from mid March 2025 showed a decrease in total protein at 5.9 mg/dL (in September 2024, total protein was 12.4 mg/dL).

## 2025-03-31 NOTE — REASON FOR VISIT
[Follow-Up Visit] : a follow-up [Language Line ] : provided by Language Line   [FreeTextEntry2] : multiple myeloma [Interpreters_IDNumber] : [id number] [Interpreters_FullName] : Gladis [TWNoteComboBox1] : Malagasy

## 2025-04-18 NOTE — HISTORY OF PRESENT ILLNESS
[de-identified] : Patient is a 60 year old male, who presents to discuss the role of an autologous HSCT in the management of their multiple myeloma. I had the pleasure of seeing the patient in consultation, who was referred by Dr Alvarez.    ---------PmHx:---------------------- HLD September 11, 2024 Hospital: PNA/Anemia. Admitted 11 days.    ---------Oncologic Hx:------------  July 2024: Right hip- medical oncology consult- radiation right hip. 10/7 - 10/18/2024 completed 3000 cGy to right iliac destructive lesion. Currently on Darzalex/Bortezomib/Dexamethasone.  Revlimid on HOLD due to drug induced bradycardia. Took only once.   ---------Interval Hx:----------------   ---------Treatment Hx------------- 9/17/2024 starts cycle #1 VDR + Darzalex. 0/7 - 10/18/2024 completed 3000 cGy to right iliac destructive lesion.  on cytoreductive treatment with Darzalex/bortezomib/dexamethasone since     9/17/2024; Revlimid on hold due to drug-induced bradycardia  ---------Pathology Hx------------- Initial Pre-treatment Biopsy: 8/26/24 ---Morphology:bone marrow studies plasma cell myeloma, 40% plasma cells,  positive. Flow cytometry-aberrant plasma cells (0.65% of cells, 87% of plasma cells) detected by flow cytometry analysis. Congo red negative. M spike 8.1 g/dL ---IHC stain: CD3, CD20, PAX5, , KappaISH, LambdaISH ---FISH:normal ---Karyotype:  ---NGS:      ---------Imaging Hx---------------- 8/12/2024 MRI right hip without contrast ([facility])- IMPRESSION: 1. Lytic expansile lesion within the right iliac crest extending to the supra-acetabular region with significant endosteal thinning, enhancement, and fluid between the lesion and the adjacent iliac us muscle which would suggest impending pathologic fracture. There is an incomplete fracture within the supra-acetabular region. There is additional 12 mm lesion with the same signal characteristics in the posterior superior acetabulum. Differential would include metastatic disease or myeloma. 2. Cam deformity with moderate arthrosis, diffuse labral tear and joint effusion.  9/9/2024 PET- IMPRESSION: 1. FDG avid lytic lesions with few examples above compatible with active multiple myeloma. For follow-up please request whole body imaging from skull vertex to toes. 2. New since the most recent CT chest nonavid groundglass changes in the right middle lobe, likely inflammatory process. Reassess on follow-up.   ---------Social:----------------------- -The patient was born in [facility] and currently lives in [place] [facility]. - Patient is single. Lives with friend. One son in [place], at least 30 years old and healthy. - Patient has 3 sister and 1 brother. One older brother, 3 younger sister. Healthy and well. Older sister has feet swelling. Unsure of health. 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  -  Denies smoker, Social alcohol use, No other drug use   --------------------Surgical Hx: ------------------------  N/A   --------------------Past Family Hx:----------------------  Mother: Passed away from cancer-believes it was colon cancer. Father: Smoker- lung/heart problems.

## 2025-04-18 NOTE — HISTORY OF PRESENT ILLNESS
[de-identified] : Patient is a 60 year old male, who presents to discuss the role of an autologous HSCT in the management of their multiple myeloma. I had the pleasure of seeing the patient in consultation, who was referred by Dr Alvarez.    ---------PmHx:---------------------- HLD September 11, 2024 Hospital: PNA/Anemia. Admitted 11 days.    ---------Oncologic Hx:------------  July 2024: Right hip- medical oncology consult- radiation right hip. 10/7 - 10/18/2024 completed 3000 cGy to right iliac destructive lesion. Currently on Darzalex/Bortezomib/Dexamethasone.  Revlimid on HOLD due to drug induced bradycardia. Took only once.   ---------Interval Hx:----------------   ---------Treatment Hx------------- 9/17/2024 starts cycle #1 VDR + Darzalex. 0/7 - 10/18/2024 completed 3000 cGy to right iliac destructive lesion.  on cytoreductive treatment with Darzalex/bortezomib/dexamethasone since     9/17/2024; Revlimid on hold due to drug-induced bradycardia  ---------Pathology Hx------------- Initial Pre-treatment Biopsy: 8/26/24 ---Morphology:bone marrow studies plasma cell myeloma, 40% plasma cells,  positive. Flow cytometry-aberrant plasma cells (0.65% of cells, 87% of plasma cells) detected by flow cytometry analysis. Congo red negative. M spike 8.1 g/dL ---IHC stain: CD3, CD20, PAX5, , KappaISH, LambdaISH ---FISH:normal ---Karyotype:  ---NGS:      ---------Imaging Hx---------------- 8/12/2024 MRI right hip without contrast ([facility])- IMPRESSION: 1. Lytic expansile lesion within the right iliac crest extending to the supra-acetabular region with significant endosteal thinning, enhancement, and fluid between the lesion and the adjacent iliac us muscle which would suggest impending pathologic fracture. There is an incomplete fracture within the supra-acetabular region. There is additional 12 mm lesion with the same signal characteristics in the posterior superior acetabulum. Differential would include metastatic disease or myeloma. 2. Cam deformity with moderate arthrosis, diffuse labral tear and joint effusion.  9/9/2024 PET- IMPRESSION: 1. FDG avid lytic lesions with few examples above compatible with active multiple myeloma. For follow-up please request whole body imaging from skull vertex to toes. 2. New since the most recent CT chest nonavid groundglass changes in the right middle lobe, likely inflammatory process. Reassess on follow-up.   ---------Social:----------------------- -The patient was born in [facility] and currently lives in [facility]. - Patient is single. Lives with friend. One son in [place], at least 30 years old and healthy. - Patient has 3 sister and 1 brother. One older brother, 3 younger sister. Healthy and well. Older sister has feet swelling. Unsure of health. 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  -  Denies smoker, Social alcohol use, No other drug use   --------------------Surgical Hx: ------------------------  N/A   --------------------Past Family Hx:----------------------  Mother: Passed away from cancer-believes it was colon cancer. Father: Smoker- lung/heart problems.

## 2025-04-18 NOTE — REASON FOR VISIT
[Initial Consultation] : an initial consultation for [Other: _____] : [unfilled] [FreeTextEntry2] : Multiple Myeloma [Interpreters_IDNumber] : [id number] [Interpreters_FullName] : Zoran

## 2025-04-18 NOTE — ASSESSMENT
[FreeTextEntry1] : Patient is a 60 year old male diagnosed with multiple myeloma   Multiple Myeloma: type:IgG lambda multiple myeloma diagnosed July 2024 symptomatic vs asymptomatic: Symptomatic ISS stage: R-ISS stage: Durie [place] Stage: Cytogenetic risk:   Patient's history, pathology, imaging, and plan was reviewed.   Patient has completed VDR + darzalex x 6. Currently receiving darzalex/bortezomib and dexamethasone. with   cycles of ***, achieving good response. At this point, the patient appears to have achieved a good response , as evidence by  % reduction in M spike vs % reduction in free light chains . This is a BMT Indications for an autologous stem cell transplant based on the ASTCT practice guidelines. The patient is an excellent candidate for transplant and has great social support. We spoke to the patient at length regarding their disease, treatment options, and the role of autologous HSCT in the treatment of their disease  and the expected morbidity and mortality as well as impact on disease-free, overall survival.  We discussed stem cell mobilization with filgrastim and plerixafor, tunneled plasmapheresis catheter placement, stem cell collection via apheresis, conditioning chemotherapy Autologous BMT Conditioning Regimen , autologous stem cell transplant hospital admission, expected hospital course, side effects of treatment, discharge medication, post-transplant outpatient follows-up and precautions. The patient verbalized understanding and their questions have been answered.   We encouraged the patient to take the time to read our transplant book. They will be referred to our , for a psycho/social evaluation prior to transplant. They will receive for further education at a later time.  We have referred the patient for dental evaluation and clearance.   We recommend completing cycle *** of  darzalex/bortezomib/dexamethasone and restaging with PET/CT vs bone marrow biopsy .  The patient verbalized understanding of plan. We will reach out to their primary hematologist with our recommendations. The patient was encouraged to contact us with any questions or concerns.   Currently on mepron, acyclclovir, protonix, fluconazole. At this time patient wishes to  consider having a transplant in [place] where his sister lives. Will regroup in two weeks with his sister on phone  pt met with sw, support may be an issue  Follow-up: -RTC in 2 weeks with Dr Perry Amador MD [facility] Transplantation [facility] Cellular Therapy [facility]

## 2025-04-18 NOTE — ASSESSMENT
[FreeTextEntry1] : Patient is a 60 year old male diagnosed with multiple myeloma   Multiple Myeloma: type:IgG lambda multiple myeloma diagnosed July 2024 symptomatic vs asymptomatic: Symptomatic ISS stage: R-ISS stage: Durie [place] Stage: Cytogenetic risk:   Patient's history, pathology, imaging, and plan was reviewed.   Patient has completed VDR + darzalex x 6. Currently receiving darzalex/bortezomib and dexamethasone. with   cycles of ***, achieving good response. At this point, the patient appears to have achieved a good response , as evidence by  % reduction in M spike vs % reduction in free light chains . This is a BMT Indications for an autologous stem cell transplant based on the ASTCT practice guidelines. The patient is an excellent candidate for transplant and has great social support. We spoke to the patient at length regarding their disease, treatment options, and the role of autologous HSCT in the treatment of their disease  and the expected morbidity and mortality as well as impact on disease-free, overall survival.  We discussed stem cell mobilization with filgrastim and plerixafor, tunneled plasmapheresis catheter placement, stem cell collection via apheresis, conditioning chemotherapy Autologous BMT Conditioning Regimen , autologous stem cell transplant hospital admission, expected hospital course, side effects of treatment, discharge medication, post-transplant outpatient follows-up and precautions. The patient verbalized understanding and their questions have been answered.   We encouraged the patient to take the time to read our transplant book. They will be referred to our , for a psycho/social evaluation prior to transplant. They will receive for further education at a later time.  We have referred the patient for dental evaluation and clearance.   We recommend completing cycle *** of  darzalex/bortezomib/dexamethasone and restaging with PET/CT vs bone marrow biopsy .  The patient verbalized understanding of plan. We will reach out to their primary hematologist with our recommendations. The patient was encouraged to contact us with any questions or concerns.   Currently on mepron, acyclclovir, protonix, fluconazole. At this time patient wishes to  consider having a transplant in [place] where his sister lives. Will regroup in two weeks with his sister on phone  pt met with sw, support may be an issue  Follow-up: -RTC in 2 weeks with Dr Perry Amador MD [facility] Adult Transplantation and Cellular Therapy Program

## 2025-04-18 NOTE — REASON FOR VISIT
[Initial Consultation] : an initial consultation for [Other: _____] : [unfilled] [FreeTextEntry2] : Multiple Myeloma [Interpreters_IDNumber] : [id number] [Interpreters_FullName] : Zoran

## 2025-04-28 NOTE — REASON FOR VISIT
[Follow-Up Visit] : a follow-up [Language Line ] : provided by Language Line   [FreeTextEntry2] : multiple myeloma [Interpreters_IDNumber] : [id number] [Interpreters_FullName] : Jose R [TWNoteComboBox1] : Citizen of [place]

## 2025-04-28 NOTE — HISTORY OF PRESENT ILLNESS
[de-identified] : 60M, PMHx colon polyps, reporting right hip pain since August 2024, referred for medical oncology consultation of right pelvis bone mass.  CASE SYNOPSIS: 7/23/2024 colonoscopy( Dr. Emely Mccarthy)-findings: A 12 mm polyp in the sigmoid colon, semi- pedunculated, removed with hot snare External hemorrhoids grade 1  8/12/2024 MRI right hip without contrast ([facility])- IMPRESSION: 1.  Lytic expansile lesion within the right iliac crest extending to the supra-acetabular region with significant endosteal thinning, enhancement, and fluid between the lesion and the adjacent iliac us muscle which would suggest impending pathologic fracture.  There is an incomplete fracture within the supra-acetabular region.  There is additional 12 mm lesion with the same signal characteristics in the posterior superior acetabulum.  Differential would include metastatic disease or myeloma. 2.  Cam deformity with moderate arthrosis, diffuse labral tear and joint effusion.  8/23/2024 TTE- IMPRESSION:  1. Left ventricular cavity is normal in size. Left ventricular wall thickness is normal. Left ventricular systolic function is mildly decreased with an ejection fraction of 52 % by Ruiz's method of disks. There are no regional wall motion abnormalities seen.  2. There is mild (grade 1) left ventricular diastolic dysfunction, with normal left ventricular filling pressure.  3. Enlarged right ventricular cavity size, with normal wall thickness, and normal right ventricular systolic function.  4. Mild mitral regurgitation.  5. Normal left and right atrial size.  6. No pericardial effusion seen.  7. No prior echocardiogram is available for comparison.  8/26/2024 bone marrow studies plasma cell myeloma, 40% plasma cells,  positive. Flow cytometry-aberrant plasma cells (0.65% of cells, 87% of plasma cells) detected by flow cytometry analysis.  Congo red negative.  M spike 8.1 g/dL  9/9/2024 PET- IMPRESSION: 1. FDG avid lytic lesions with few examples above compatible with active multiple myeloma. For follow-up please request whole body imaging from skull vertex to toes. 2. New since the most recent CT chest nonavid groundglass changes in the right middle lobe, likely inflammatory process. Reassess on follow-up.  10/7 - 10/18/2024 completed 3000 cGy to right iliac destructive lesion.  12/11/2024-patient complaining of chest pain after taking lenalidomide.  Found bradycardic (HR 44/min); Revlimid discontinued..  9/17/2024 starts cycle #1 VDR + Darzalex  [FreeTextEntry1] : Workup in progress [de-identified] : Since his last visit in March, patient was evaluated for autologous stem cell transplant by Dr. Amador, and was found to be an excellent candidate for SCT.  Currently completing cycle #8 bortezomib, dexamethasone, Darzalex.  States he is feeling fine, complaining of occasional right leg pain denies paresthesias, nausea or vomiting.  Compliant with antibiotic prophylaxis.  Hematology picture consistent with very good response, normalization of kappa/lambda ratio, a drop in IgG from 8625 mg/dL in August 2024 to 768 mg/dL presently.

## 2025-05-05 NOTE — HISTORY OF PRESENT ILLNESS
[de-identified] : Patient is a 60 year old male, who presents to discuss the role of an autologous HSCT in the management of their multiple myeloma. I had the pleasure of seeing the patient in consultation, who was referred by Dr Alvarez.    ---------PmHx:---------------------- HLD September 11, 2024 Hospital: PNA/Anemia. Admitted 11 days.    ---------Oncologic Hx:------------  July 2024: Right hip- medical oncology consult- radiation right hip. 10/7 - 10/18/2024 completed 3000 cGy to right iliac destructive lesion. Currently on Darzalex/Bortezomib/Dexamethasone.  Revlimid on HOLD due to drug induced bradycardia. Took only once.   ---------Interval Hx:----------------   ---------Treatment Hx------------- 9/17/2024 starts cycle #1 VDR + Darzalex. 0/7 - 10/18/2024 completed 3000 cGy to right iliac destructive lesion.  on cytoreductive treatment with Darzalex/bortezomib/dexamethasone since     9/17/2024; Revlimid on hold due to drug-induced bradycardia  ---------Pathology Hx------------- Initial Pre-treatment Biopsy: 8/26/24 ---Morphology:bone marrow studies plasma cell myeloma, 40% plasma cells,  positive. Flow cytometry-aberrant plasma cells (0.65% of cells, 87% of plasma cells) detected by flow cytometry analysis. Congo red negative. M spike 8.1 g/dL ---IHC stain: CD3, CD20, PAX5, , KappaISH, LambdaISH ---FISH:normal ---Karyotype:  ---NGS:      ---------Imaging Hx---------------- 8/12/2024 MRI right hip without contrast ([facility])- IMPRESSION: 1. Lytic expansile lesion within the right iliac crest extending to the supra-acetabular region with significant endosteal thinning, enhancement, and fluid between the lesion and the adjacent iliac us muscle which would suggest impending pathologic fracture. There is an incomplete fracture within the supra-acetabular region. There is additional 12 mm lesion with the same signal characteristics in the posterior superior acetabulum. Differential would include metastatic disease or myeloma. 2. Cam deformity with moderate arthrosis, diffuse labral tear and joint effusion.  9/9/2024 PET- IMPRESSION: 1. FDG avid lytic lesions with few examples above compatible with active multiple myeloma. For follow-up please request whole body imaging from skull vertex to toes. 2. New since the most recent CT chest nonavid groundglass changes in the right middle lobe, likely inflammatory process. Reassess on follow-up.   ---------Social:----------------------- -The patient was born in [facility] and currently lives in [facility]. - Patient is single. Lives with friend. One son in [place], at least 30 years old and healthy. - Patient has 3 sister and 1 brother. One older brother, 3 younger sister. Healthy and well. Older sister has feet swelling. Unsure of health. 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  -  Denies smoker, Social alcohol use, No other drug use   --------------------Surgical Hx: ------------------------  N/A   --------------------Past Family Hx:----------------------  Mother: Passed away from cancer-believes it was colon cancer. Father: Smoker- lung/heart problems.           5/5/25..pt here for velcade..spoke with him via  and sister on phone...he confirms that he is moving to [place] for the procedure..

## 2025-05-05 NOTE — REASON FOR VISIT
[Follow-Up Visit] : a follow-up visit for [Other: _____] : [unfilled] [FreeTextEntry2] : Multiple Myeloma [TWNoteComboBox1] : [place]

## 2025-05-05 NOTE — HISTORY OF PRESENT ILLNESS
[de-identified] : Patient is a 60 year old male, who presents to discuss the role of an autologous HSCT in the management of their multiple myeloma. I had the pleasure of seeing the patient in consultation, who was referred by Dr Alvarez.    ---------PmHx:---------------------- HLD September 11, 2024 Hospital: PNA/Anemia. Admitted 11 days.    ---------Oncologic Hx:------------  July 2024: Right hip- medical oncology consult- radiation right hip. 10/7 - 10/18/2024 completed 3000 cGy to right iliac destructive lesion. Currently on Darzalex/Bortezomib/Dexamethasone.  Revlimid on HOLD due to drug induced bradycardia. Took only once.   ---------Interval Hx:----------------   ---------Treatment Hx------------- 9/17/2024 starts cycle #1 VDR + Darzalex. 0/7 - 10/18/2024 completed 3000 cGy to right iliac destructive lesion.  on cytoreductive treatment with Darzalex/bortezomib/dexamethasone since     9/17/2024; Revlimid on hold due to drug-induced bradycardia  ---------Pathology Hx------------- Initial Pre-treatment Biopsy: 8/26/24 ---Morphology:bone marrow studies plasma cell myeloma, 40% plasma cells,  positive. Flow cytometry-aberrant plasma cells (0.65% of cells, 87% of plasma cells) detected by flow cytometry analysis. Congo red negative. M spike 8.1 g/dL ---IHC stain: CD3, CD20, PAX5, , KappaISH, LambdaISH ---FISH:normal ---Karyotype:  ---NGS:      ---------Imaging Hx---------------- 8/12/2024 MRI right hip without contrast ([facility])- IMPRESSION: 1. Lytic expansile lesion within the right iliac crest extending to the supra-acetabular region with significant endosteal thinning, enhancement, and fluid between the lesion and the adjacent iliac us muscle which would suggest impending pathologic fracture. There is an incomplete fracture within the supra-acetabular region. There is additional 12 mm lesion with the same signal characteristics in the posterior superior acetabulum. Differential would include metastatic disease or myeloma. 2. Cam deformity with moderate arthrosis, diffuse labral tear and joint effusion.  9/9/2024 PET- IMPRESSION: 1. FDG avid lytic lesions with few examples above compatible with active multiple myeloma. For follow-up please request whole body imaging from skull vertex to toes. 2. New since the most recent CT chest nonavid groundglass changes in the right middle lobe, likely inflammatory process. Reassess on follow-up.   ---------Social:----------------------- -The patient was born in [facility] and currently lives in [facility]. - Patient is single. Lives with friend. One son in [place], at least 30 years old and healthy. - Patient has 3 sister and 1 brother. One older brother, 3 younger sister. Healthy and well. Older sister has feet swelling. Unsure of health. 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  -  Denies smoker, Social alcohol use, No other drug use   --------------------Surgical Hx: ------------------------  N/A   --------------------Past Family Hx:----------------------  Mother: Passed away from cancer-believes it was colon cancer. Father: Smoker- lung/heart problems.           5/5/25..pt here for velcade..spoke with him via  and sister on phone...he confirms that he is moving to [place] for the procedure..

## 2025-05-05 NOTE — REASON FOR VISIT
[Follow-Up Visit] : a follow-up visit for [Other: _____] : [unfilled] [FreeTextEntry2] : Multiple Myeloma [TWNoteComboBox1] : Mexican

## 2025-05-05 NOTE — ASSESSMENT
[FreeTextEntry1] : Patient is a 60 year old male diagnosed with multiple myeloma   Multiple Myeloma: type:IgG lambda multiple myeloma diagnosed July 2024 symptomatic vs asymptomatic: Symptomatic ISS stage: R-ISS stage: Durie [place] Stage: Cytogenetic risk:   Patient's history, pathology, imaging, and plan was reviewed.   Patient has completed VDR + darzalex x 6. Currently receiving darzalex/bortezomib and dexamethasone. with   cycles  achieving good response. At this point, the patient appears to have achieved a good response , as evidence by reduction in M spike and reduction in free light chains . This is a BMT Indications for an autologous stem cell transplant based on the ASTCT practice guidelines. The patient is an excellent candidate for transplant and has great social support. We spoke to the patient at length regarding their disease, treatment options, and the role of autologous HSCT in the treatment of their disease  and the expected morbidity and mortality as well as impact on disease-free, overall survival.  We discussed stem cell mobilization with filgrastim and plerixafor, tunneled plasmapheresis catheter placement, stem cell collection via apheresis, conditioning chemotherapy Autologous BMT Conditioning Regimen , autologous stem cell transplant hospital admission, expected hospital course, side effects of treatment, discharge medication, post-transplant outpatient follows-up and precautions. The patient verbalized understanding and their questions have been answered.   We encouraged the patient to take the time to read our transplant book. They will be referred to our , for a psycho/social evaluation prior to transplant. They will receive for further education at a later time.  We have referred the patient for dental evaluation and clearance.   We recommend completing cycle 6,7 of  darzalex/bortezomib/dexamethasone and restaging with PET/CT and bone marrow biopsy .  The patient verbalized understanding of plan. We will reach out to their primary hematologist with our recommendations. The patient was encouraged to contact us with any questions or concerns.   Currently on mepron, acyclclovir, protonix, fluconazole. At this time patient wishes to  consider having a transplant in [place] where his sister lives. Will regroup in two weeks with his sister on phone ..that is today's visit.. pt met with sw, support may be an issue Advised pt to obtain appt at [facility]...Dr Muñoz is director of ..[phone]...I spoke with her office and will send records...disability paperwork also completed..   Herbie Amador MD [facility] Adult Transplantation and Cellular Therapy Program

## 2025-05-05 NOTE — ASSESSMENT
[FreeTextEntry1] : Patient is a 60 year old male diagnosed with multiple myeloma   Multiple Myeloma: type:IgG lambda multiple myeloma diagnosed July 2024 symptomatic vs asymptomatic: Symptomatic ISS stage: R-ISS stage: [place]ie [place] Stage: Cytogenetic risk:   Patient's history, pathology, imaging, and plan was reviewed.   Patient has completed VDR + darzalex x 6. Currently receiving darzalex/bortezomib and dexamethasone. with   cycles  achieving good response. At this point, the patient appears to have achieved a good response , as evidence by reduction in M spike and reduction in free light chains . This is a BMT Indications for an autologous stem cell transplant based on the ASTCT practice guidelines. The patient is an excellent candidate for transplant and has great social support. We spoke to the patient at length regarding their disease, treatment options, and the role of autologous HSCT in the treatment of their disease  and the expected morbidity and mortality as well as impact on disease-free, overall survival.  We discussed stem cell mobilization with filgrastim and plerixafor, tunneled plasmapheresis catheter placement, stem cell collection via apheresis, conditioning chemotherapy Autologous BMT Conditioning Regimen , autologous stem cell transplant hospital admission, expected hospital course, side effects of treatment, discharge medication, post-transplant outpatient follows-up and precautions. The patient verbalized understanding and their questions have been answered.   We encouraged the patient to take the time to read our transplant book. They will be referred to our , for a psycho/social evaluation prior to transplant. They will receive for further education at a later time.  We have referred the patient for dental evaluation and clearance.   We recommend completing cycle 6,7 of  darzalex/bortezomib/dexamethasone and restaging with PET/CT and bone marrow biopsy .  The patient verbalized understanding of plan. We will reach out to their primary hematologist with our recommendations. The patient was encouraged to contact us with any questions or concerns.   Currently on mepron, acyclclovir, protonix, fluconazole. At this time patient wishes to  consider having a transplant in [place] where his sister lives. Will regroup in two weeks with his sister on phone ..that is today's visit.. pt met with sw, support may be an issue Advised pt to obtain appt at [facility]...Dr Muñoz is director of ..[phone]...I spoke with her office and will send records...disability paperwork also completed..   Herbie Amador MD [facility] Adult Transplantation and Cellular Therapy Program